# Patient Record
Sex: MALE | Race: BLACK OR AFRICAN AMERICAN | NOT HISPANIC OR LATINO | Employment: FULL TIME | URBAN - METROPOLITAN AREA
[De-identification: names, ages, dates, MRNs, and addresses within clinical notes are randomized per-mention and may not be internally consistent; named-entity substitution may affect disease eponyms.]

---

## 2017-01-18 ENCOUNTER — CONVERSION ENCOUNTER (OUTPATIENT)
Dept: FAMILY MEDICINE CLINIC | Facility: CLINIC | Age: 37
End: 2017-01-18

## 2017-01-18 ENCOUNTER — HOSPITAL ENCOUNTER (OUTPATIENT)
Dept: FAMILY MEDICINE CLINIC | Facility: CLINIC | Age: 37
Setting detail: SPECIMEN
Discharge: HOME | End: 2017-01-18
Attending: FAMILY MEDICINE | Admitting: FAMILY MEDICINE

## 2017-01-18 LAB
BILIRUB UR QL STRIP: NEGATIVE MG/DL
CASTS URNS QL MICRO: ABNORMAL /[LPF]
COLOR UR: YELLOW
CONV BACTERIA IN URINE MICRO: NEGATIVE
CONV CLARITY OF URINE: ABNORMAL
CONV HYALINE CASTS IN URINE MICRO: 2 /[LPF] (ref 0–5)
CONV PROTEIN IN URINE BY AUTOMATED TEST STRIP: NEGATIVE MG/DL
CONV SMALL ROUND CELLS: ABNORMAL /[HPF]
CONV UROBILINOGEN IN URINE BY AUTOMATED TEST STRIP: 0.2 MG/DL
CULTURE INDICATED?: ABNORMAL
GLUCOSE UR QL: NEGATIVE MG/DL
HGB UR QL STRIP: NEGATIVE
KETONES UR QL STRIP: NEGATIVE MG/DL
LEUKOCYTE ESTERASE UR QL STRIP: ABNORMAL
NITRITE UR QL STRIP: NEGATIVE
PH UR STRIP.AUTO: 6 [PH] (ref 4.5–8)
RBC #/AREA URNS HPF: 1 /[HPF] (ref 0–3)
SP GR UR: 1.02 (ref 1–1.03)
SPERM URNS QL MICRO: ABNORMAL /[HPF]
SQUAMOUS SPT QL MICRO: 1 /[HPF] (ref 0–5)
UNIDENT CRYS URNS QL MICRO: ABNORMAL /[HPF]
WBC #/AREA URNS HPF: 10 /[HPF] (ref 0–5)
YEAST SPEC QL WET PREP: ABNORMAL /[HPF]

## 2017-02-03 ENCOUNTER — CONVERSION ENCOUNTER (OUTPATIENT)
Dept: FAMILY MEDICINE CLINIC | Facility: CLINIC | Age: 37
End: 2017-02-03

## 2017-02-03 ENCOUNTER — HOSPITAL ENCOUNTER (OUTPATIENT)
Dept: FAMILY MEDICINE CLINIC | Facility: CLINIC | Age: 37
Setting detail: SPECIMEN
Discharge: HOME | End: 2017-02-03
Attending: FAMILY MEDICINE | Admitting: FAMILY MEDICINE

## 2017-02-03 LAB
ANION GAP SERPL CALC-SCNC: 9.7 MMOL/L (ref 10–20)
BUN SERPL-MCNC: 16 MG/DL (ref 8–20)
BUN/CREAT SERPL: 10 (ref 6.2–20.3)
CALCIUM SERPL-MCNC: 9.3 MG/DL (ref 8.9–10.3)
CHLORIDE SERPL-SCNC: 104 MMOL/L (ref 101–111)
CONV CO2: 25 MMOL/L (ref 22–32)
CREAT UR-MCNC: 1.6 MG/DL (ref 0.7–1.2)
ERYTHROCYTE [DISTWIDTH] IN BLOOD BY AUTOMATED COUNT: 14.3 % (ref 11.5–14.5)
GLUCOSE SERPL-MCNC: 65 MG/DL (ref 65–99)
HCT VFR BLD AUTO: 46.2 % (ref 40–54)
HGB BLD-MCNC: 15 G/DL (ref 14–18)
MCH RBC QN AUTO: 27 PG (ref 26–32)
MCHC RBC AUTO-ENTMCNC: 32.4 G/DL (ref 32–36)
MCV RBC AUTO: 83.2 FL (ref 80–94)
PLATELET # BLD AUTO: 199 10*3/UL (ref 150–450)
PMV BLD AUTO: 8.7 FL (ref 7.4–10.4)
POTASSIUM SERPL-SCNC: 4.7 MMOL/L (ref 3.6–5.1)
RBC # BLD AUTO: 5.55 10*6/UL (ref 4.6–6)
SODIUM SERPL-SCNC: 134 MMOL/L (ref 136–144)
TSH SERPL-ACNC: 42.22 UIU/ML (ref 0.34–5.6)
WBC # BLD AUTO: 4 10*3/UL (ref 4.5–11.5)

## 2017-02-14 ENCOUNTER — HOSPITAL ENCOUNTER (OUTPATIENT)
Dept: ONCOLOGY | Facility: CLINIC | Age: 37
Discharge: HOME | End: 2017-02-14
Attending: INTERNAL MEDICINE | Admitting: INTERNAL MEDICINE

## 2017-02-14 LAB
BASOPHILS # BLD AUTO: 0 10*3/UL (ref 0–0.2)
BASOPHILS NFR BLD AUTO: 1 % (ref 0–2)
DIFFERENTIAL METHOD BLD: (no result)
EOSINOPHIL # BLD AUTO: 0.4 10*3/UL (ref 0–0.3)
EOSINOPHIL # BLD AUTO: 8 % (ref 0–3)
ERYTHROCYTE [DISTWIDTH] IN BLOOD BY AUTOMATED COUNT: 14.4 % (ref 11.5–14.5)
FOLATE SERPL-MCNC: >24.8 NG/ML (ref 5.9–24.8)
HCT VFR BLD AUTO: 46.4 % (ref 40–54)
HGB BLD-MCNC: 15.3 G/DL (ref 14–18)
LDH SERPL-CCNC: 200 IU/L (ref 98–192)
LYMPHOCYTES # BLD AUTO: 1.7 10*3/UL (ref 0.8–4.8)
LYMPHOCYTES NFR BLD AUTO: 33 % (ref 18–42)
MAGNESIUM UR-MCNC: 0.82 % (ref 0.5–1.5)
MCH RBC QN AUTO: 27 PG (ref 26–32)
MCHC RBC AUTO-ENTMCNC: 32.9 G/DL (ref 32–36)
MCV RBC AUTO: 82.2 FL (ref 80–94)
MONOCYTES # BLD AUTO: 0.3 10*3/UL (ref 0.1–1.3)
MONOCYTES NFR BLD AUTO: 7 % (ref 2–11)
NEUTROPHILS # BLD AUTO: 2.7 10*3/UL (ref 2.3–8.6)
NEUTROPHILS NFR BLD AUTO: 51 % (ref 50–75)
NRBC BLD AUTO-RTO: 0 /100{WBCS}
NRBC/RBC NFR BLD MANUAL: 0 10*3/UL
PLATELET # BLD AUTO: 190 10*3/UL (ref 150–450)
PMV BLD AUTO: 9.2 FL (ref 7.4–10.4)
RBC # BLD AUTO: 5.65 10*6/UL (ref 4.6–6)
RETICS/RBC NFR MANUAL: 0.05 10*6/UL
VIT B12 SERPL-MCNC: 589 PG/ML (ref 180–914)
WBC # BLD AUTO: 5.2 10*3/UL (ref 4.5–11.5)

## 2017-02-15 LAB
ANA SER QL IA: ABNORMAL
CENTROMERE B AB SER-ACNC: NEGATIVE
DSDNA AB SER-ACNC: NEGATIVE [IU]/ML
ENA JO1 IGG SER-ACNC: NEGATIVE
ENA RNP AB SER-ACNC: NEGATIVE
ENA SCL70 AB SER QL IA: NEGATIVE
ENA SM AB SER-ACNC: NEGATIVE
ENA SS-B AB SER-ACNC: NEGATIVE
HISTONE IGG SER IA-ACNC: NEGATIVE
SJOGREN'S ANTI-SS-A: ABNORMAL

## 2017-03-01 ENCOUNTER — HOSPITAL ENCOUNTER (OUTPATIENT)
Dept: ONCOLOGY | Facility: CLINIC | Age: 37
Discharge: HOME | End: 2017-03-01
Attending: INTERNAL MEDICINE | Admitting: INTERNAL MEDICINE

## 2018-02-19 ENCOUNTER — HOSPITAL ENCOUNTER (OUTPATIENT)
Dept: FAMILY MEDICINE CLINIC | Facility: CLINIC | Age: 38
Setting detail: SPECIMEN
Discharge: HOME | End: 2018-02-19
Attending: FAMILY MEDICINE | Admitting: FAMILY MEDICINE

## 2018-04-11 ENCOUNTER — CONVERSION ENCOUNTER (OUTPATIENT)
Dept: FAMILY MEDICINE CLINIC | Facility: CLINIC | Age: 38
End: 2018-04-11

## 2018-04-11 ENCOUNTER — HOSPITAL ENCOUNTER (OUTPATIENT)
Dept: FAMILY MEDICINE CLINIC | Facility: CLINIC | Age: 38
Setting detail: SPECIMEN
Discharge: HOME | End: 2018-04-11
Attending: FAMILY MEDICINE | Admitting: FAMILY MEDICINE

## 2018-04-11 LAB
ALBUMIN SERPL-MCNC: 4 G/DL (ref 3.5–4.8)
ALBUMIN/GLOB SERPL: 1.6 {RATIO} (ref 1–1.7)
ALP SERPL-CCNC: 43 IU/L (ref 32–91)
ALT SERPL-CCNC: 21 IU/L (ref 17–63)
ANION GAP SERPL CALC-SCNC: 10.8 MMOL/L (ref 10–20)
AST SERPL-CCNC: 29 IU/L (ref 15–41)
BASOPHILS # BLD AUTO: 0 10*3/UL (ref 0–0.2)
BASOPHILS NFR BLD AUTO: 1 % (ref 0–2)
BILIRUB SERPL-MCNC: 0.9 MG/DL (ref 0.3–1.2)
BUN SERPL-MCNC: 11 MG/DL (ref 8–20)
BUN/CREAT SERPL: 8.5 (ref 6.2–20.3)
CALCIUM SERPL-MCNC: 9.6 MG/DL (ref 8.9–10.3)
CHLORIDE SERPL-SCNC: 102 MMOL/L (ref 101–111)
CHOLEST SERPL-MCNC: 159 MG/DL
CHOLEST/HDLC SERPL: 3.5 {RATIO}
CONV CO2: 28 MMOL/L (ref 22–32)
CONV LDL CHOLESTEROL DIRECT: 86 MG/DL (ref 0–100)
CONV TOTAL PROTEIN: 6.5 G/DL (ref 6.1–7.9)
CREAT UR-MCNC: 1.3 MG/DL (ref 0.7–1.2)
DIFFERENTIAL METHOD BLD: (no result)
EOSINOPHIL # BLD AUTO: 0.2 10*3/UL (ref 0–0.3)
EOSINOPHIL # BLD AUTO: 5 % (ref 0–3)
ERYTHROCYTE [DISTWIDTH] IN BLOOD BY AUTOMATED COUNT: 14.8 % (ref 11.5–14.5)
GLOBULIN UR ELPH-MCNC: 2.5 G/DL (ref 2.5–3.8)
GLUCOSE SERPL-MCNC: 91 MG/DL (ref 65–99)
HCT VFR BLD AUTO: 43.1 % (ref 40–54)
HDLC SERPL-MCNC: 46 MG/DL
HGB BLD-MCNC: 13.9 G/DL (ref 14–18)
LDLC/HDLC SERPL: 1.9 {RATIO}
LIPID INTERPRETATION: ABNORMAL
LYMPHOCYTES # BLD AUTO: 1.7 10*3/UL (ref 0.8–4.8)
LYMPHOCYTES NFR BLD AUTO: 40 % (ref 18–42)
MCH RBC QN AUTO: 26.7 PG (ref 26–32)
MCHC RBC AUTO-ENTMCNC: 32.3 G/DL (ref 32–36)
MCV RBC AUTO: 82.6 FL (ref 80–94)
MONOCYTES # BLD AUTO: 0.2 10*3/UL (ref 0.1–1.3)
MONOCYTES NFR BLD AUTO: 5 % (ref 2–11)
NEUTROPHILS # BLD AUTO: 2.1 10*3/UL (ref 2.3–8.6)
NEUTROPHILS NFR BLD AUTO: 49 % (ref 50–75)
NRBC BLD AUTO-RTO: 0 /100{WBCS}
NRBC/RBC NFR BLD MANUAL: 0 10*3/UL
PLATELET # BLD AUTO: 193 10*3/UL (ref 150–450)
PMV BLD AUTO: 8.6 FL (ref 7.4–10.4)
POTASSIUM SERPL-SCNC: 3.8 MMOL/L (ref 3.6–5.1)
RBC # BLD AUTO: 5.22 10*6/UL (ref 4.6–6)
SODIUM SERPL-SCNC: 137 MMOL/L (ref 136–144)
TRIGL SERPL-MCNC: 65 MG/DL
VLDLC SERPL CALC-MCNC: 27.4 MG/DL
WBC # BLD AUTO: 4.3 10*3/UL (ref 4.5–11.5)

## 2018-08-28 ENCOUNTER — HOSPITAL ENCOUNTER (OUTPATIENT)
Dept: FAMILY MEDICINE CLINIC | Facility: CLINIC | Age: 38
Setting detail: SPECIMEN
Discharge: HOME | End: 2018-08-28
Attending: FAMILY MEDICINE | Admitting: FAMILY MEDICINE

## 2018-08-28 LAB
ANION GAP SERPL CALC-SCNC: 11.6 MMOL/L (ref 10–20)
BUN SERPL-MCNC: 13 MG/DL (ref 8–20)
BUN/CREAT SERPL: 10.8 (ref 6.2–20.3)
CALCIUM SERPL-MCNC: 9.7 MG/DL (ref 8.9–10.3)
CHLORIDE SERPL-SCNC: 106 MMOL/L (ref 101–111)
CONV CO2: 27 MMOL/L (ref 22–32)
CREAT UR-MCNC: 1.2 MG/DL (ref 0.7–1.2)
GLUCOSE SERPL-MCNC: 79 MG/DL (ref 65–99)
POTASSIUM SERPL-SCNC: 4.6 MMOL/L (ref 3.6–5.1)
SODIUM SERPL-SCNC: 140 MMOL/L (ref 136–144)

## 2019-06-04 VITALS
OXYGEN SATURATION: 100 % | WEIGHT: 231 LBS | OXYGEN SATURATION: 100 % | HEIGHT: 69 IN | WEIGHT: 226 LBS | WEIGHT: 229.13 LBS | SYSTOLIC BLOOD PRESSURE: 134 MMHG | BODY MASS INDEX: 34.21 KG/M2 | HEART RATE: 53 BPM | SYSTOLIC BLOOD PRESSURE: 128 MMHG | DIASTOLIC BLOOD PRESSURE: 77 MMHG | HEART RATE: 60 BPM | SYSTOLIC BLOOD PRESSURE: 123 MMHG | HEART RATE: 58 BPM | RESPIRATION RATE: 16 BRPM | OXYGEN SATURATION: 98 % | RESPIRATION RATE: 14 BRPM | DIASTOLIC BLOOD PRESSURE: 86 MMHG | RESPIRATION RATE: 16 BRPM | DIASTOLIC BLOOD PRESSURE: 77 MMHG

## 2019-06-27 RX ORDER — LEVOTHYROXINE SODIUM 0.15 MG/1
TABLET ORAL EVERY 24 HOURS
COMMUNITY
Start: 2017-08-29 | End: 2019-07-10 | Stop reason: SDUPTHER

## 2019-06-27 RX ORDER — HYDROCORTISONE ACETATE 25 MG/1
SUPPOSITORY RECTAL
COMMUNITY
Start: 2019-01-17 | End: 2019-11-25

## 2019-06-27 RX ORDER — VALACYCLOVIR HYDROCHLORIDE 1 G/1
TABLET, FILM COATED ORAL
COMMUNITY
Start: 2018-08-30 | End: 2019-07-10 | Stop reason: SDUPTHER

## 2019-06-28 RX ORDER — VALACYCLOVIR HYDROCHLORIDE 1 G/1
TABLET, FILM COATED ORAL
Qty: 20 TABLET | Refills: 0 | OUTPATIENT
Start: 2019-06-28

## 2019-07-03 RX ORDER — LEVOTHYROXINE SODIUM 0.15 MG/1
TABLET ORAL
Qty: 30 TABLET | Refills: 0 | OUTPATIENT
Start: 2019-07-03

## 2019-07-09 NOTE — TELEPHONE ENCOUNTER
Refill Levothyroxine and Valacyclovir.  [address] [place] [address] in [place].  Scheduled appt on 8/12/19.

## 2019-07-10 RX ORDER — LEVOTHYROXINE SODIUM 0.15 MG/1
150 TABLET ORAL EVERY 24 HOURS
Qty: 31 TABLET | Refills: 0 | Status: SHIPPED | OUTPATIENT
Start: 2019-07-10 | End: 2019-09-13 | Stop reason: SDUPTHER

## 2019-07-10 RX ORDER — VALACYCLOVIR HYDROCHLORIDE 1 G/1
TABLET, FILM COATED ORAL
Qty: 20 TABLET | Refills: 0 | Status: SHIPPED | OUTPATIENT
Start: 2019-07-10 | End: 2019-10-21 | Stop reason: SDUPTHER

## 2019-08-23 RX ORDER — LEVOTHYROXINE SODIUM 0.15 MG/1
TABLET ORAL
Qty: 31 TABLET | Refills: 0 | OUTPATIENT
Start: 2019-08-23

## 2019-08-26 ENCOUNTER — OFFICE VISIT (OUTPATIENT)
Dept: FAMILY MEDICINE CLINIC | Facility: CLINIC | Age: 39
End: 2019-08-26

## 2019-08-26 VITALS
SYSTOLIC BLOOD PRESSURE: 133 MMHG | HEIGHT: 69 IN | WEIGHT: 215 LBS | TEMPERATURE: 97.9 F | BODY MASS INDEX: 31.84 KG/M2 | OXYGEN SATURATION: 99 % | DIASTOLIC BLOOD PRESSURE: 85 MMHG | HEART RATE: 60 BPM | RESPIRATION RATE: 18 BRPM

## 2019-08-26 DIAGNOSIS — E05.00 GRAVES DISEASE: Primary | ICD-10-CM

## 2019-08-26 DIAGNOSIS — E03.9 ACQUIRED HYPOTHYROIDISM: ICD-10-CM

## 2019-08-26 DIAGNOSIS — E55.9 VITAMIN D DEFICIENCY: ICD-10-CM

## 2019-08-26 DIAGNOSIS — E78.49 OTHER HYPERLIPIDEMIA: ICD-10-CM

## 2019-08-26 DIAGNOSIS — R63.5 WEIGHT GAIN: ICD-10-CM

## 2019-08-26 DIAGNOSIS — R73.9 HYPERGLYCEMIA: ICD-10-CM

## 2019-08-26 LAB
ALBUMIN SERPL-MCNC: 3.8 G/DL (ref 3.5–4.8)
ALBUMIN/GLOB SERPL: 1.6 G/DL (ref 1–1.7)
ALP SERPL-CCNC: 40 U/L (ref 32–91)
ALT SERPL W P-5'-P-CCNC: 24 U/L (ref 17–63)
ANION GAP SERPL CALCULATED.3IONS-SCNC: 13.4 MMOL/L (ref 5–15)
ARTICHOKE IGE QN: 93 MG/DL (ref 0–100)
AST SERPL-CCNC: 30 U/L (ref 15–41)
BASOPHILS # BLD AUTO: 0.1 10*3/MM3 (ref 0–0.2)
BASOPHILS NFR BLD AUTO: 1.5 % (ref 0–1.5)
BILIRUB SERPL-MCNC: 0.9 MG/DL (ref 0.3–1.2)
BUN BLD-MCNC: 12 MG/DL (ref 8–20)
BUN/CREAT SERPL: 7.1 (ref 6.2–20.3)
CALCIUM SPEC-SCNC: 9 MG/DL (ref 8.9–10.3)
CHLORIDE SERPL-SCNC: 105 MMOL/L (ref 101–111)
CHOLEST SERPL-MCNC: 150 MG/DL
CO2 SERPL-SCNC: 27 MMOL/L (ref 22–32)
CREAT BLD-MCNC: 1.7 MG/DL (ref 0.7–1.2)
DEPRECATED RDW RBC AUTO: 40.3 FL (ref 37–54)
EOSINOPHIL # BLD AUTO: 0.2 10*3/MM3 (ref 0–0.4)
EOSINOPHIL NFR BLD AUTO: 4.8 % (ref 0.3–6.2)
ERYTHROCYTE [DISTWIDTH] IN BLOOD BY AUTOMATED COUNT: 13.8 % (ref 12.3–15.4)
GFR SERPL CREATININE-BSD FRML MDRD: 55 ML/MIN/1.73
GLOBULIN UR ELPH-MCNC: 2.4 GM/DL (ref 2.5–3.8)
GLUCOSE BLD-MCNC: 88 MG/DL (ref 65–99)
HCT VFR BLD AUTO: 41 % (ref 37.5–51)
HDLC SERPL QL: 3.19
HDLC SERPL-MCNC: 47 MG/DL
HGB BLD-MCNC: 13.8 G/DL (ref 13–17.7)
LDLC/HDLC SERPL: 1.83 {RATIO}
LYMPHOCYTES # BLD AUTO: 1.7 10*3/MM3 (ref 0.7–3.1)
LYMPHOCYTES NFR BLD AUTO: 42.7 % (ref 19.6–45.3)
MCH RBC QN AUTO: 28 PG (ref 26.6–33)
MCHC RBC AUTO-ENTMCNC: 33.7 G/DL (ref 31.5–35.7)
MCV RBC AUTO: 83.3 FL (ref 79–97)
MONOCYTES # BLD AUTO: 0.2 10*3/MM3 (ref 0.1–0.9)
MONOCYTES NFR BLD AUTO: 5.8 % (ref 5–12)
NEUTROPHILS # BLD AUTO: 1.8 10*3/MM3 (ref 1.7–7)
NEUTROPHILS NFR BLD AUTO: 45.2 % (ref 42.7–76)
NRBC BLD AUTO-RTO: 0 /100 WBC (ref 0–0.2)
PLATELET # BLD AUTO: 181 10*3/MM3 (ref 140–450)
PMV BLD AUTO: 9 FL (ref 6–12)
POTASSIUM BLD-SCNC: 4.4 MMOL/L (ref 3.6–5.1)
PROT SERPL-MCNC: 6.2 G/DL (ref 6.1–7.9)
RBC # BLD AUTO: 4.92 10*6/MM3 (ref 4.14–5.8)
SODIUM BLD-SCNC: 141 MMOL/L (ref 136–144)
T4 FREE SERPL-MCNC: 0.77 NG/DL (ref 0.58–1.64)
TRIGL SERPL-MCNC: 86 MG/DL
TSH SERPL DL<=0.05 MIU/L-ACNC: 11.23 MIU/ML (ref 0.34–5.6)
VLDLC SERPL-MCNC: 17.2 MG/DL
WBC NRBC COR # BLD: 4 10*3/MM3 (ref 3.4–10.8)

## 2019-08-26 PROCEDURE — 85025 COMPLETE CBC W/AUTO DIFF WBC: CPT | Performed by: FAMILY MEDICINE

## 2019-08-26 PROCEDURE — 82306 VITAMIN D 25 HYDROXY: CPT | Performed by: FAMILY MEDICINE

## 2019-08-26 PROCEDURE — 83036 HEMOGLOBIN GLYCOSYLATED A1C: CPT | Performed by: FAMILY MEDICINE

## 2019-08-26 PROCEDURE — 80061 LIPID PANEL: CPT | Performed by: FAMILY MEDICINE

## 2019-08-26 PROCEDURE — 36415 COLL VENOUS BLD VENIPUNCTURE: CPT | Performed by: FAMILY MEDICINE

## 2019-08-26 PROCEDURE — 84443 ASSAY THYROID STIM HORMONE: CPT | Performed by: FAMILY MEDICINE

## 2019-08-26 PROCEDURE — 80053 COMPREHEN METABOLIC PANEL: CPT | Performed by: FAMILY MEDICINE

## 2019-08-26 PROCEDURE — 99214 OFFICE O/P EST MOD 30 MIN: CPT | Performed by: FAMILY MEDICINE

## 2019-08-26 PROCEDURE — 84439 ASSAY OF FREE THYROXINE: CPT | Performed by: FAMILY MEDICINE

## 2019-08-26 NOTE — PROGRESS NOTES
Subjective   Bennett Wheeler is a 39 y.o. male.     Chief Complaint   Patient presents with   • Thyroid Problem     meds       History of Present Illness   38 y/o  male  Hx Graves, now with hypothyroid, needs labs to check lip[oids, glucose, Vit d  Needs wellness labs  Family hx DM, hx elevated creatinine    The following portions of the patient's history were reviewed and updated as appropriate: allergies, current medications, past family history, past medical history, past social history, past surgical history and problem list.    Past Medical History:   Diagnosis Date   • Graves disease    • Hemorrhoids    • Thyroid disease        Past Surgical History:   Procedure Laterality Date   • LIPOMA EXCISION  2015    HEAD   • TONSILLECTOMY         Family History   Problem Relation Age of Onset   • Heart disease Mother    • Heart disease Father    • Diabetes Maternal Grandmother    • Heart disease Maternal Grandmother    • Hypertension Maternal Grandmother        Review of Systems   Constitutional: Negative for fatigue, unexpected weight gain and unexpected weight loss.   HENT: Negative for congestion, sinus pressure and sore throat.    Eyes: Negative for blurred vision and visual disturbance.   Respiratory: Negative for cough, shortness of breath and wheezing.    Cardiovascular: Negative for chest pain, palpitations and leg swelling.   Gastrointestinal: Negative for abdominal pain, constipation, diarrhea, nausea, vomiting, GERD and indigestion.   Musculoskeletal: Negative for arthralgias, back pain, gait problem, joint swelling and neck pain.   Skin: Negative for rash, skin lesions and bruise.   Allergic/Immunologic: Negative for environmental allergies.   Neurological: Negative for dizziness, tremors, syncope, weakness, light-headedness, headache and memory problem.   Psychiatric/Behavioral: Negative for sleep disturbance, depressed mood and stress. The patient is not nervous/anxious.        Objective    Physical Exam   Constitutional: He is oriented to person, place, and time. He appears well-developed and well-nourished. No distress.   HENT:   Head: Normocephalic and atraumatic.   Right Ear: External ear normal.   Left Ear: External ear normal.   Nose: Nose normal.   Mouth/Throat: Oropharynx is clear and moist.   Eyes: EOM are normal. Pupils are equal, round, and reactive to light.   Neck: Normal range of motion. Neck supple. No thyromegaly present.   Cardiovascular: Normal rate, regular rhythm and normal heart sounds. Exam reveals no gallop and no friction rub.   No murmur heard.  Pulmonary/Chest: Effort normal. He has no wheezes.   Abdominal: Soft. There is no tenderness.   Musculoskeletal: Normal range of motion. He exhibits no edema, tenderness or deformity.   Lymphadenopathy:     He has no cervical adenopathy.   Neurological: He is alert and oriented to person, place, and time. No cranial nerve deficit.   Skin: Skin is warm and dry. No rash noted. He is not diaphoretic.   Psychiatric: He has a normal mood and affect. His behavior is normal. Judgment and thought content normal.   Nursing note and vitals reviewed.        Assessment/Plan   Bennett was seen today for thyroid problem.    Diagnoses and all orders for this visit:    Graves disease    Acquired hypothyroidism  -     TSH  -     T4, Free  -     CBC & Differential  -     Comprehensive Metabolic Panel  -     CBC Auto Differential    Other hyperlipidemia  -     Lipid Panel; Future  -     Lipid Panel    Hyperglycemia  -     Hemoglobin A1c    Vitamin D deficiency  -     Vitamin D 25 Hydroxy    Weight gain      Fu labs  Refill meds as needed  Fu with Dr. Wall  Increase exercise  Weight loss recommended           Vitals:    08/26/19 1547   BP: 133/85   Pulse: 60   Resp: 18   Temp: 97.9 °F (36.6 °C)   SpO2: 99%       LDL Cholesterol    Date Value Ref Range Status   04/11/2018 86 0 - 100 mg/dL Final

## 2019-08-27 LAB
25(OH)D3 SERPL-MCNC: 29.7 NG/ML (ref 30–100)
HBA1C MFR BLD: 5.9 % (ref 3.5–5.6)

## 2019-09-13 ENCOUNTER — APPOINTMENT (OUTPATIENT)
Dept: GENERAL RADIOLOGY | Facility: HOSPITAL | Age: 39
End: 2019-09-13

## 2019-09-13 ENCOUNTER — HOSPITAL ENCOUNTER (EMERGENCY)
Facility: HOSPITAL | Age: 39
Discharge: HOME | End: 2019-09-13
Attending: EMERGENCY MEDICINE | Admitting: EMERGENCY MEDICINE

## 2019-09-13 VITALS
HEIGHT: 69 IN | SYSTOLIC BLOOD PRESSURE: 123 MMHG | HEART RATE: 55 BPM | DIASTOLIC BLOOD PRESSURE: 78 MMHG | WEIGHT: 214.29 LBS | OXYGEN SATURATION: 99 % | TEMPERATURE: 98.4 F | RESPIRATION RATE: 14 BRPM | BODY MASS INDEX: 31.74 KG/M2

## 2019-09-13 DIAGNOSIS — N28.9 RENAL INSUFFICIENCY: ICD-10-CM

## 2019-09-13 DIAGNOSIS — S91.312A FOOT LACERATION, LEFT, INITIAL ENCOUNTER: Primary | ICD-10-CM

## 2019-09-13 DIAGNOSIS — E03.9 ACQUIRED HYPOTHYROIDISM: Primary | ICD-10-CM

## 2019-09-13 PROCEDURE — 90715 TDAP VACCINE 7 YRS/> IM: CPT | Performed by: EMERGENCY MEDICINE

## 2019-09-13 PROCEDURE — 99283 EMERGENCY DEPT VISIT LOW MDM: CPT

## 2019-09-13 PROCEDURE — 73630 X-RAY EXAM OF FOOT: CPT

## 2019-09-13 PROCEDURE — 25010000002 TDAP 5-2.5-18.5 LF-MCG/0.5 SUSPENSION: Performed by: EMERGENCY MEDICINE

## 2019-09-13 PROCEDURE — 90471 IMMUNIZATION ADMIN: CPT | Performed by: EMERGENCY MEDICINE

## 2019-09-13 RX ORDER — LEVOTHYROXINE SODIUM 175 UG/1
175 TABLET ORAL EVERY 24 HOURS
Qty: 90 TABLET | Refills: 0 | Status: SHIPPED | OUTPATIENT
Start: 2019-09-13 | End: 2019-11-25 | Stop reason: SDUPTHER

## 2019-09-13 RX ORDER — GINSENG 100 MG
CAPSULE ORAL
Status: COMPLETED
Start: 2019-09-13 | End: 2019-09-13

## 2019-09-13 RX ORDER — DIAPER,BRIEF,INFANT-TODD,DISP
EACH MISCELLANEOUS EVERY 12 HOURS SCHEDULED
Status: DISCONTINUED | OUTPATIENT
Start: 2019-09-13 | End: 2019-09-13 | Stop reason: HOSPADM

## 2019-09-13 RX ORDER — CEPHALEXIN 500 MG/1
500 CAPSULE ORAL 3 TIMES DAILY
Qty: 15 CAPSULE | Refills: 0 | Status: SHIPPED | OUTPATIENT
Start: 2019-09-13 | End: 2019-10-21

## 2019-09-13 RX ORDER — LEVOTHYROXINE SODIUM 0.15 MG/1
TABLET ORAL
Qty: 31 TABLET | Refills: 0 | Status: CANCELLED | OUTPATIENT
Start: 2019-09-13

## 2019-09-13 RX ORDER — LEVOTHYROXINE SODIUM 0.15 MG/1
150 TABLET ORAL EVERY 24 HOURS
Qty: 31 TABLET | Refills: 0 | Status: CANCELLED | OUTPATIENT
Start: 2019-09-13

## 2019-09-13 RX ADMIN — TETANUS TOXOID, REDUCED DIPHTHERIA TOXOID AND ACELLULAR PERTUSSIS VACCINE, ADSORBED 0.5 ML: 5; 2.5; 8; 8; 2.5 SUSPENSION INTRAMUSCULAR at 05:12

## 2019-09-13 RX ADMIN — Medication: at 06:48

## 2019-09-13 RX ADMIN — BACITRACIN: 500 OINTMENT TOPICAL at 06:48

## 2019-09-13 NOTE — DISCHARGE INSTRUCTIONS
Keep wound clean with soap and water and apply antibiotic ointment 2-3 times daily.  Return for fever redness increasing pain red streaks swelling foul odor purulent drainage or any other new or worse problems or concerns.  Keflex

## 2019-09-13 NOTE — ED PROVIDER NOTES
Subjective   Chief complaint laceration left foot.    History of present illness 39-year-old male cut his left foot on some glass in [employer] 2 days ago.  He did not have sutures placed he presents this morning for evaluation of the wound.  Mild pain no current bleeding 2 days old continuous nothing makes it better or worse.  Tetanus is not current            Review of Systems   Constitutional: Negative for chills and fever.   Skin: Negative for color change and pallor.   Neurological: Negative for weakness and numbness.   Psychiatric/Behavioral: Negative for agitation and behavioral problems.       Past Medical History:   Diagnosis Date   • Graves disease    • Hemorrhoids    • Thyroid disease        No Known Allergies    Past Surgical History:   Procedure Laterality Date   • LIPOMA EXCISION  2015    HEAD   • TONSILLECTOMY         Family History   Problem Relation Age of Onset   • Heart disease Mother    • Heart disease Father    • Diabetes Maternal Grandmother    • Heart disease Maternal Grandmother    • Hypertension Maternal Grandmother        Social History     Socioeconomic History   • Marital status: Single     Spouse name: Not on file   • Number of children: Not on file   • Years of education: Not on file   • Highest education level: Not on file   Tobacco Use   • Smoking status: Never Smoker   • Smokeless tobacco: Never Used   Substance and Sexual Activity   • Alcohol use: Yes     Frequency: Monthly or less     Prior to Admission medications    Medication Sig Start Date End Date Taking? Authorizing Provider   hydrocortisone (ANUCORT-HC) 25 MG suppository ANUCORT-HC 25 MG SUPP 1/17/19   Shayy Brooks MD   hydrocortisone-pramoxine (ANALPRAM HC) 2.5-1 % rectal cream ANALPRAM HC 2.5-1 % CREA 1/16/19   Shayy Brooks MD   levothyroxine (SYNTHROID) 150 MCG tablet Take 1 tablet by mouth Daily. 7/10/19   Leanna Beth MD   valACYclovir (VALTREX) 1000 MG tablet 1 tab by mouth daily x 5 days PRN  outbreak 7/10/19   Leanna Beth MD           Objective   Physical Exam  39-year-old male awake alert no acute distress.  Left foot has a 2 similar laceration is healing on the left heel mildly open not very deep there is no tenderness palpation is no surrounding erythema warmth no drainage or foul odor.  No red streaks no pain to palpation patient moves his toes and foot without difficulty squeeze on the cath was downgoing no ankle pain neurovascular sensory intact.  No signs of infection.  Procedures           ED Course      Results for orders placed or performed in visit on 08/26/19   TSH   Result Value Ref Range    TSH 11.230 (H) 0.340 - 5.600 mIU/mL   T4, Free   Result Value Ref Range    Free T4 0.77 0.58 - 1.64 ng/dL   Comprehensive Metabolic Panel   Result Value Ref Range    Glucose 88 65 - 99 mg/dL    BUN 12 8 - 20 mg/dL    Creatinine 1.70 (H) 0.70 - 1.20 mg/dL    Sodium 141 136 - 144 mmol/L    Potassium 4.4 3.6 - 5.1 mmol/L    Chloride 105 101 - 111 mmol/L    CO2 27.0 22.0 - 32.0 mmol/L    Calcium 9.0 8.9 - 10.3 mg/dL    Total Protein 6.2 6.1 - 7.9 g/dL    Albumin 3.80 3.50 - 4.80 g/dL    ALT (SGPT) 24 17 - 63 U/L    AST (SGOT) 30 15 - 41 U/L    Alkaline Phosphatase 40 32 - 91 U/L    Total Bilirubin 0.9 0.3 - 1.2 mg/dL    eGFR  African Amer 55 (L) >60 mL/min/1.73    Globulin 2.4 (L) 2.5 - 3.8 gm/dL    A/G Ratio 1.6 1.0 - 1.7 g/dL    BUN/Creatinine Ratio 7.1 6.2 - 20.3    Anion Gap 13.4 5.0 - 15.0 mmol/L   Vitamin D 25 Hydroxy   Result Value Ref Range    25 Hydroxy, Vitamin D 29.7 (L) 30.0 - 100.0 ng/ml   Hemoglobin A1c   Result Value Ref Range    Hemoglobin A1C 5.9 (H) 3.5 - 5.6 %   Lipid Panel   Result Value Ref Range    Total Cholesterol 150 <=200 mg/dL    Triglycerides 86 <=150 mg/dL    HDL Cholesterol 47 >=39 mg/dL    LDL Cholesterol  93 0 - 100 mg/dL    VLDL Cholesterol 17.2 mg/dL    LDL/HDL Ratio 1.83     Chol/HDL Ratio 3.19    CBC Auto Differential   Result Value Ref Range    WBC 4.00 3.40 - 10.80  10*3/mm3    RBC 4.92 4.14 - 5.80 10*6/mm3    Hemoglobin 13.8 13.0 - 17.7 g/dL    Hematocrit 41.0 37.5 - 51.0 %    MCV 83.3 79.0 - 97.0 fL    MCH 28.0 26.6 - 33.0 pg    MCHC 33.7 31.5 - 35.7 g/dL    RDW 13.8 12.3 - 15.4 %    RDW-SD 40.3 37.0 - 54.0 fl    MPV 9.0 6.0 - 12.0 fL    Platelets 181 140 - 450 10*3/mm3    Neutrophil % 45.2 42.7 - 76.0 %    Lymphocyte % 42.7 19.6 - 45.3 %    Monocyte % 5.8 5.0 - 12.0 %    Eosinophil % 4.8 0.3 - 6.2 %    Basophil % 1.5 0.0 - 1.5 %    Neutrophils, Absolute 1.80 1.70 - 7.00 10*3/mm3    Lymphocytes, Absolute 1.70 0.70 - 3.10 10*3/mm3    Monocytes, Absolute 0.20 0.10 - 0.90 10*3/mm3    Eosinophils, Absolute 0.20 0.00 - 0.40 10*3/mm3    Basophils, Absolute 0.10 0.00 - 0.20 10*3/mm3    nRBC 0.0 0.0 - 0.2 /100 WBC     No radiology results for the last day  Medications   bacitracin 500 UNIT/GM ointment  - ADS Override Pull (not administered)   Tdap (BOOSTRIX) injection 0.5 mL (0.5 mL Intramuscular Given 9/13/19 0512)                   MDM  Number of Diagnoses or Management Options  Foot laceration, left, initial encounter:   Diagnosis management comments: Medical decision making.  Patient had the above exam and evaluation patient has a wound is mostly superficial it is 2 days old and on the bottom of the foot and unable to suture at this point.  X-rays were negative for any foreign bodies.  The patient was made aware of the findings we talked about signs of infection and what to return for.  We clean it up we dressed it I do not see any glass in the wound when I looked at it and explored it.  He was stable he has no pain to palpation he will be discharged home for outpatient management.  His tetanus was updated      Final diagnoses:   Foot laceration, left, initial encounter              [place], Rufus S, MD  09/13/19 0646

## 2019-10-21 ENCOUNTER — OFFICE VISIT (OUTPATIENT)
Dept: FAMILY MEDICINE CLINIC | Facility: CLINIC | Age: 39
End: 2019-10-21

## 2019-10-21 VITALS
WEIGHT: 219 LBS | TEMPERATURE: 97.8 F | OXYGEN SATURATION: 100 % | BODY MASS INDEX: 32.44 KG/M2 | SYSTOLIC BLOOD PRESSURE: 135 MMHG | DIASTOLIC BLOOD PRESSURE: 88 MMHG | RESPIRATION RATE: 14 BRPM | HEART RATE: 55 BPM | HEIGHT: 69 IN

## 2019-10-21 DIAGNOSIS — S91.312A LACERATION OF LEFT FOOT, INITIAL ENCOUNTER: Primary | ICD-10-CM

## 2019-10-21 DIAGNOSIS — Z86.19 H/O COLD SORES: ICD-10-CM

## 2019-10-21 PROBLEM — R79.89 ABNORMAL CBC: Status: ACTIVE | Noted: 2017-02-07

## 2019-10-21 PROBLEM — R79.89 SERUM CREATININE RAISED: Status: ACTIVE | Noted: 2017-02-05

## 2019-10-21 PROBLEM — N28.9 RENAL INSUFFICIENCY, MILD: Status: ACTIVE | Noted: 2018-02-19

## 2019-10-21 PROBLEM — R30.0 DIFFICULT OR PAINFUL URINATION: Status: ACTIVE | Noted: 2017-01-18

## 2019-10-21 PROBLEM — E07.9 THYROID DISORDER: Status: ACTIVE | Noted: 2018-02-19

## 2019-10-21 PROBLEM — Z13.6 ENCOUNTER FOR LIPID SCREENING FOR CARDIOVASCULAR DISEASE: Status: ACTIVE | Noted: 2018-04-11

## 2019-10-21 PROBLEM — R35.0 INCREASED FREQUENCY OF URINATION: Status: ACTIVE | Noted: 2017-01-18

## 2019-10-21 PROBLEM — R82.90 ABNORMAL URINALYSIS: Status: ACTIVE | Noted: 2017-01-18

## 2019-10-21 PROBLEM — M25.569 KNEE PAIN: Status: ACTIVE | Noted: 2018-04-11

## 2019-10-21 PROBLEM — Z13.220 ENCOUNTER FOR LIPID SCREENING FOR CARDIOVASCULAR DISEASE: Status: ACTIVE | Noted: 2018-04-11

## 2019-10-21 PROCEDURE — 99213 OFFICE O/P EST LOW 20 MIN: CPT | Performed by: FAMILY MEDICINE

## 2019-10-21 RX ORDER — VALACYCLOVIR HYDROCHLORIDE 1 G/1
TABLET, FILM COATED ORAL
Qty: 20 TABLET | Refills: 0 | Status: SHIPPED | OUTPATIENT
Start: 2019-10-21 | End: 2019-11-25 | Stop reason: SDUPTHER

## 2019-10-21 NOTE — PROGRESS NOTES
Subjective   Bennett Wheeler is a 39 y.o. male.     Chief Complaint   Patient presents with   • Foot Injury     pt cut his foot on a piece of glass in [facility] and went to [facility] ER when he got back and they did a foot Xray,TDAP and gave him a antibiotic.          Current Outpatient Medications:   •  hydrocortisone-pramoxine (ANALPRAM HC) 2.5-1 % rectal cream, ANALPRAM HC 2.5-1 % CREA, Disp: , Rfl:   •  levothyroxine (SYNTHROID, LEVOTHROID) 175 MCG tablet, Take 1 tablet by mouth Daily., Disp: 90 tablet, Rfl: 0  •  valACYclovir (VALTREX) 1000 MG tablet, 2 po bid x 24hrs prn outbreak, Disp: 20 tablet, Rfl: 0  •  hydrocortisone (ANUCORT-HC) 25 MG suppository, ANUCORT-HC 25 MG SUPP, Disp: , Rfl:     Past Medical History:   Diagnosis Date   • Difficult or painful urination 1/18/2017   • Fatigue 7/14/2015   • Graves disease    • Hemorrhoids, external 7/14/2016   • Impotence of organic origin 7/14/2015   • Knee pain 4/11/2018   • Leukocytopenia 8/23/2015   • Leukocytosis 12/15/2015   • Lipoma of skin 4/7/2015   • Obesity 7/14/2016   • Renal function test abnormal 12/15/2015   • Renal insufficiency, mild 2/19/2018   • Serum creatinine raised 2/5/2017   • Snoring 7/14/2016   • Thyroid disorder 2/19/2018       Past Surgical History:   Procedure Laterality Date   • LIPOMA EXCISION  2015    HEAD   • TONSILLECTOMY         Family History   Problem Relation Age of Onset   • Heart disease Mother    • Heart disease Father    • Diabetes Maternal Grandmother    • Heart disease Maternal Grandmother    • Hypertension Maternal Grandmother        Social History     Socioeconomic History   • Marital status: Single     Spouse name: Not on file   • Number of children: Not on file   • Years of education: Not on file   • Highest education level: Not on file   Tobacco Use   • Smoking status: Never Smoker   • Smokeless tobacco: Never Used   Substance and Sexual Activity   • Alcohol use: Yes     Frequency: Monthly or less     Comment: occ   • Drug use:  No   • Sexual activity: Defer        38 y/o AA male here for f/u on a Left heel laceration     Pt states it happened while working in [place] mid Sept----pt stepped on glass cutting his Left heel (cost of seeing someone there would have been $500) and they were leaving the next day so he cleaned it w/ soap and water and kept a bandage on it until seen by ER here w/in 48hrs.... Td was given in ER and Xray done (neg)/ Abx given; Pt is doing well but needing clearance to go back to working out at the gym/ sauna use         The following portions of the patient's history were reviewed and updated as appropriate: allergies, current medications, past family history, past medical history, past social history, past surgical history and problem list.    Review of Systems   Constitutional: Negative for fever.   Musculoskeletal: Negative for gait problem.   Skin: Negative for dry skin, rash and bruise.       Vitals:    10/21/19 0940   BP: 135/88   Pulse: 55   Resp: 14   Temp: 97.8 °F (36.6 °C)   SpO2: 100%       Objective   Physical Exam   Constitutional: He is oriented to person, place, and time. He appears well-developed and well-nourished. No distress.   HENT:   Head: Normocephalic and atraumatic.        Neurological: He is alert and oriented to person, place, and time. No cranial nerve deficit.   Skin: Skin is warm and dry.   Nursing note and vitals reviewed.        Assessment/Plan   Bennett was seen today for foot injury.    Diagnoses and all orders for this visit:    Laceration of left foot, initial encounter    H/O cold sores    Other orders  -     valACYclovir (VALTREX) 1000 MG tablet; 2 po bid x 24hrs prn outbreak    Pt cleared to restart gym and monitor laceration

## 2019-11-19 ENCOUNTER — CLINICAL SUPPORT (OUTPATIENT)
Dept: FAMILY MEDICINE CLINIC | Facility: CLINIC | Age: 39
End: 2019-11-19

## 2019-11-19 DIAGNOSIS — N28.9 RENAL INSUFFICIENCY: ICD-10-CM

## 2019-11-19 DIAGNOSIS — E03.9 ACQUIRED HYPOTHYROIDISM: ICD-10-CM

## 2019-11-19 PROCEDURE — 80048 BASIC METABOLIC PNL TOTAL CA: CPT | Performed by: FAMILY MEDICINE

## 2019-11-19 PROCEDURE — 86376 MICROSOMAL ANTIBODY EACH: CPT | Performed by: FAMILY MEDICINE

## 2019-11-19 PROCEDURE — 84439 ASSAY OF FREE THYROXINE: CPT | Performed by: FAMILY MEDICINE

## 2019-11-19 PROCEDURE — 84443 ASSAY THYROID STIM HORMONE: CPT | Performed by: FAMILY MEDICINE

## 2019-11-19 PROCEDURE — 36415 COLL VENOUS BLD VENIPUNCTURE: CPT | Performed by: FAMILY MEDICINE

## 2019-11-20 LAB
ANION GAP SERPL CALCULATED.3IONS-SCNC: 11.5 MMOL/L (ref 5–15)
BUN BLD-MCNC: 20 MG/DL (ref 6–20)
BUN/CREAT SERPL: 17.5 (ref 7–25)
CALCIUM SPEC-SCNC: 9.4 MG/DL (ref 8.6–10.5)
CHLORIDE SERPL-SCNC: 99 MMOL/L (ref 98–107)
CO2 SERPL-SCNC: 28.5 MMOL/L (ref 22–29)
CREAT BLD-MCNC: 1.14 MG/DL (ref 0.76–1.27)
GFR SERPL CREATININE-BSD FRML MDRD: 87 ML/MIN/1.73
GLUCOSE BLD-MCNC: 89 MG/DL (ref 65–99)
POTASSIUM BLD-SCNC: 4.9 MMOL/L (ref 3.5–5.2)
SODIUM BLD-SCNC: 139 MMOL/L (ref 136–145)
T4 FREE SERPL-MCNC: 0.85 NG/DL (ref 0.93–1.7)
TSH SERPL DL<=0.05 MIU/L-ACNC: 21.9 UIU/ML (ref 0.27–4.2)

## 2019-11-21 LAB — THYROPEROXIDASE AB SERPL-ACNC: 99 IU/ML (ref 0–34)

## 2019-11-25 ENCOUNTER — OFFICE VISIT (OUTPATIENT)
Dept: FAMILY MEDICINE CLINIC | Facility: CLINIC | Age: 39
End: 2019-11-25

## 2019-11-25 VITALS
WEIGHT: 214 LBS | OXYGEN SATURATION: 99 % | SYSTOLIC BLOOD PRESSURE: 123 MMHG | TEMPERATURE: 97.9 F | DIASTOLIC BLOOD PRESSURE: 80 MMHG | HEIGHT: 69 IN | RESPIRATION RATE: 14 BRPM | HEART RATE: 65 BPM | BODY MASS INDEX: 31.7 KG/M2

## 2019-11-25 DIAGNOSIS — R73.01 FASTING HYPERGLYCEMIA: ICD-10-CM

## 2019-11-25 DIAGNOSIS — E03.9 ACQUIRED HYPOTHYROIDISM: Primary | ICD-10-CM

## 2019-11-25 DIAGNOSIS — S91.312D LACERATION OF LEFT HEEL, SUBSEQUENT ENCOUNTER: ICD-10-CM

## 2019-11-25 PROCEDURE — 99213 OFFICE O/P EST LOW 20 MIN: CPT | Performed by: FAMILY MEDICINE

## 2019-11-25 RX ORDER — MULTIPLE VITAMINS W/ MINERALS TAB 9MG-400MCG
1 TAB ORAL DAILY
COMMUNITY
End: 2021-06-09

## 2019-11-25 RX ORDER — DIAPER,BRIEF,INFANT-TODD,DISP
EACH MISCELLANEOUS DAILY PRN
COMMUNITY
End: 2021-06-09

## 2019-11-25 RX ORDER — LEVOTHYROXINE SODIUM 175 UG/1
175 TABLET ORAL EVERY 24 HOURS
Qty: 90 TABLET | Refills: 0 | Status: SHIPPED | OUTPATIENT
Start: 2019-11-25 | End: 2020-02-12 | Stop reason: SDUPTHER

## 2019-11-25 RX ORDER — VALACYCLOVIR HYDROCHLORIDE 1 G/1
TABLET, FILM COATED ORAL
Qty: 20 TABLET | Refills: 0 | Status: SHIPPED | OUTPATIENT
Start: 2019-11-25 | End: 2020-04-20

## 2020-02-11 ENCOUNTER — TELEPHONE (OUTPATIENT)
Dept: FAMILY MEDICINE CLINIC | Facility: CLINIC | Age: 40
End: 2020-02-11

## 2020-02-11 NOTE — TELEPHONE ENCOUNTER
Levothyroxine 175 mcg    [facility]    Last Seen 11/25/2019  Next Appt 2/26/2020

## 2020-02-12 RX ORDER — LEVOTHYROXINE SODIUM 175 UG/1
175 TABLET ORAL EVERY 24 HOURS
Qty: 30 TABLET | Refills: 0 | Status: SHIPPED | OUTPATIENT
Start: 2020-02-12 | End: 2020-02-26 | Stop reason: SDUPTHER

## 2020-02-12 NOTE — TELEPHONE ENCOUNTER
Patient scheduled for labs on 2/20.  Requesting at least a partial refill be sent in to get him through until his appt.

## 2020-02-20 ENCOUNTER — CLINICAL SUPPORT (OUTPATIENT)
Dept: FAMILY MEDICINE CLINIC | Facility: CLINIC | Age: 40
End: 2020-02-20

## 2020-02-20 DIAGNOSIS — E03.9 ACQUIRED HYPOTHYROIDISM: ICD-10-CM

## 2020-02-20 DIAGNOSIS — R73.01 FASTING HYPERGLYCEMIA: ICD-10-CM

## 2020-02-20 PROCEDURE — 84439 ASSAY OF FREE THYROXINE: CPT | Performed by: FAMILY MEDICINE

## 2020-02-20 PROCEDURE — 83036 HEMOGLOBIN GLYCOSYLATED A1C: CPT | Performed by: FAMILY MEDICINE

## 2020-02-20 PROCEDURE — 84443 ASSAY THYROID STIM HORMONE: CPT | Performed by: FAMILY MEDICINE

## 2020-02-20 PROCEDURE — 36415 COLL VENOUS BLD VENIPUNCTURE: CPT | Performed by: FAMILY MEDICINE

## 2020-02-21 LAB
HBA1C MFR BLD: 5.4 % (ref 3.5–5.6)
T4 FREE SERPL-MCNC: 0.89 NG/DL (ref 0.93–1.7)
TSH SERPL DL<=0.05 MIU/L-ACNC: 39.2 UIU/ML (ref 0.27–4.2)

## 2020-02-26 ENCOUNTER — OFFICE VISIT (OUTPATIENT)
Dept: FAMILY MEDICINE CLINIC | Facility: CLINIC | Age: 40
End: 2020-02-26

## 2020-02-26 VITALS
HEART RATE: 52 BPM | OXYGEN SATURATION: 98 % | TEMPERATURE: 97.9 F | DIASTOLIC BLOOD PRESSURE: 88 MMHG | RESPIRATION RATE: 18 BRPM | HEIGHT: 69 IN | BODY MASS INDEX: 31.99 KG/M2 | SYSTOLIC BLOOD PRESSURE: 137 MMHG | WEIGHT: 216 LBS

## 2020-02-26 DIAGNOSIS — M25.562 ACUTE PAIN OF LEFT KNEE: ICD-10-CM

## 2020-02-26 DIAGNOSIS — M77.12 LATERAL EPICONDYLITIS OF LEFT ELBOW: ICD-10-CM

## 2020-02-26 DIAGNOSIS — M70.52 SUPRAPATELLAR BURSITIS OF LEFT KNEE: ICD-10-CM

## 2020-02-26 DIAGNOSIS — E03.9 ACQUIRED HYPOTHYROIDISM: Primary | ICD-10-CM

## 2020-02-26 PROCEDURE — 99214 OFFICE O/P EST MOD 30 MIN: CPT | Performed by: FAMILY MEDICINE

## 2020-02-26 RX ORDER — LEVOTHYROXINE SODIUM 175 UG/1
175 TABLET ORAL EVERY 24 HOURS
Qty: 90 TABLET | Refills: 0 | Status: SHIPPED | OUTPATIENT
Start: 2020-02-26 | End: 2020-04-14 | Stop reason: SDUPTHER

## 2020-02-26 NOTE — PROGRESS NOTES
Subjective   Bennett Wheeler is a 39 y.o. male.     Chief Complaint   Patient presents with   • Thyroid Problem     lab results   • Follow-up     3 mon         Current Outpatient Medications:   •  hydrocortisone ( HYDROCORTISONE) 1 % cream, Apply  topically to the appropriate area as directed Daily As Needed., Disp: , Rfl:   •  levothyroxine (SYNTHROID, LEVOTHROID) 175 MCG tablet, Take 1 tablet by mouth Daily., Disp: 90 tablet, Rfl: 0  •  Multiple Vitamins-Minerals (MULTIVITAMIN WITH MINERALS) tablet tablet, Take 1 tablet by mouth Daily., Disp: , Rfl:   •  valACYclovir (VALTREX) 1000 MG tablet, 2 po bid x 24hrs prn outbreak, Disp: 20 tablet, Rfl: 0    Past Medical History:   Diagnosis Date   • Hemorrhoids, external 7/14/2016   • Hypothyroidism    • Obesity 7/14/2016       Past Surgical History:   Procedure Laterality Date   • LIPOMA EXCISION  2015    HEAD   • TONSILLECTOMY         Family History   Problem Relation Age of Onset   • Heart disease Mother    • Heart disease Father    • Diabetes Maternal Grandmother    • Heart disease Maternal Grandmother    • Hypertension Maternal Grandmother        Social History     Socioeconomic History   • Marital status: Single     Spouse name: Not on file   • Number of children: Not on file   • Years of education: Not on file   • Highest education level: Not on file   Tobacco Use   • Smoking status: Never Smoker   • Smokeless tobacco: Never Used   Substance and Sexual Activity   • Alcohol use: Yes     Frequency: 2-3 times a week     Comment: occ   • Drug use: No   • Sexual activity: Defer       38 y/o AA male w/ hypothyroid here w/ new c/o's Left elbow and knee pain    Pt admits he forgets to take the thyroid med every day......    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 [employer] and uses a handheld phone while he is working-----he started noticing pain above his elbow relieved when he put his phone down and worse at nite when he picks up his Ipad---he usu  it  for prolonged time during the day to put in and check his work orders; no tx tried; no swelling  Trying to do push ups makes it hurt too    Pt also w/ c/o's Left knee pain w/ certain movements above his knee area in the front; worse w/ sprints/ squats and when they jump on one leg during his aerobic class; no known injury and doesn't hurt all the time  Pt also started going to the gym to work out and runs laps a couple times a week (19 laps or so is a mile) and admits he doesn't change directions during the run       The following portions of the patient's history were reviewed and updated as appropriate: allergies, current medications, past family history, past medical history, past social history, past surgical history and problem list.    Review of Systems   Musculoskeletal: Positive for arthralgias and myalgias. Negative for back pain, joint swelling and neck pain.   Skin: Negative for rash, skin lesions and bruise.   Neurological: Negative for syncope and weakness.       Vitals:    02/26/20 1056   BP: 137/88   Pulse: 52   Resp: 18   Temp: 97.9 °F (36.6 °C)   SpO2: 98%       Objective   Physical Exam   Constitutional: He is oriented to person, place, and time. He appears well-developed and well-nourished. He appears distressed.   HENT:   Head: Normocephalic and atraumatic.   Musculoskeletal: Normal range of motion. He exhibits tenderness. He exhibits no edema or deformity.        Left elbow: He exhibits normal range of motion, no swelling, no effusion and no deformity. Tenderness found. Medial epicondyle and lateral epicondyle tenderness noted. No radial head and no olecranon process tenderness noted.        Left knee: He exhibits normal range of motion, no swelling, no effusion, no ecchymosis, no deformity, no laceration, no erythema, normal alignment and no bony tenderness.        Arms:       Legs:  Neurological: He is alert and oriented to person, place, and time. No cranial nerve deficit.   Skin: Skin is  warm and dry. No rash noted. No erythema.   Psychiatric: He has a normal mood and affect. His behavior is normal. Judgment and thought content normal.   Nursing note and vitals reviewed.        Assessment/Plan   Bennett was seen today for thyroid problem and follow-up.    Diagnoses and all orders for this visit:    Acquired hypothyroidism  -     T4, Free; Future  -     TSH; Future    Suprapatellar bursitis of left knee    Acute pain of left knee    Lateral epicondylitis of left elbow    Other orders  -     levothyroxine (SYNTHROID, LEVOTHROID) 175 MCG tablet; Take 1 tablet by mouth Daily.      Pt to try a tennis brace while at work or adjust work load  Pt to change directions if running in/s every 5 laps if cant run o/s  Pt to avoid one legged jumping and squats until better and use ice/ NSAIDS prn  Pt to do straight leg strengthening exercises for Left LE

## 2020-04-14 RX ORDER — LEVOTHYROXINE SODIUM 175 UG/1
175 TABLET ORAL EVERY 24 HOURS
Qty: 30 TABLET | Refills: 0 | Status: CANCELLED | OUTPATIENT
Start: 2020-04-14

## 2020-04-14 RX ORDER — LEVOTHYROXINE SODIUM 175 UG/1
175 TABLET ORAL EVERY 24 HOURS
Qty: 30 TABLET | Refills: 3 | Status: SHIPPED | OUTPATIENT
Start: 2020-04-14 | End: 2020-05-29 | Stop reason: SDUPTHER

## 2020-04-14 NOTE — TELEPHONE ENCOUNTER
Synthroid **ins will only cover 30 day only** last one was sent for 90, but he could only get 30d    [facility] [address]    Seen 2/2020 and labs  sched 5/2020 and recheck on labs

## 2020-04-20 RX ORDER — VALACYCLOVIR HYDROCHLORIDE 1 G/1
TABLET, FILM COATED ORAL
Qty: 20 TABLET | Refills: 0 | Status: SHIPPED | OUTPATIENT
Start: 2020-04-20 | End: 2020-05-26

## 2020-05-26 RX ORDER — VALACYCLOVIR HYDROCHLORIDE 1 G/1
TABLET, FILM COATED ORAL
Qty: 20 TABLET | Refills: 0 | Status: SHIPPED | OUTPATIENT
Start: 2020-05-26 | End: 2020-07-14

## 2020-05-28 PROCEDURE — 84439 ASSAY OF FREE THYROXINE: CPT | Performed by: FAMILY MEDICINE

## 2020-05-28 PROCEDURE — 84443 ASSAY THYROID STIM HORMONE: CPT | Performed by: FAMILY MEDICINE

## 2020-05-29 RX ORDER — LEVOTHYROXINE SODIUM 175 UG/1
175 TABLET ORAL EVERY 24 HOURS
Qty: 90 TABLET | Refills: 1 | Status: SHIPPED | OUTPATIENT
Start: 2020-05-29 | End: 2021-01-04

## 2020-07-14 RX ORDER — VALACYCLOVIR HYDROCHLORIDE 1 G/1
TABLET, FILM COATED ORAL
Qty: 20 TABLET | Refills: 0 | Status: SHIPPED | OUTPATIENT
Start: 2020-07-14 | End: 2020-10-12

## 2020-10-12 RX ORDER — VALACYCLOVIR HYDROCHLORIDE 1 G/1
TABLET, FILM COATED ORAL
Qty: 20 TABLET | Refills: 0 | Status: SHIPPED | OUTPATIENT
Start: 2020-10-12 | End: 2020-12-23

## 2020-12-11 ENCOUNTER — OFFICE VISIT (OUTPATIENT)
Dept: FAMILY MEDICINE CLINIC | Facility: CLINIC | Age: 40
End: 2020-12-11

## 2020-12-11 VITALS
DIASTOLIC BLOOD PRESSURE: 88 MMHG | HEIGHT: 69 IN | SYSTOLIC BLOOD PRESSURE: 139 MMHG | WEIGHT: 216 LBS | TEMPERATURE: 97.8 F | RESPIRATION RATE: 16 BRPM | BODY MASS INDEX: 31.99 KG/M2 | OXYGEN SATURATION: 100 % | HEART RATE: 60 BPM

## 2020-12-11 DIAGNOSIS — H10.31 ACUTE CONJUNCTIVITIS OF RIGHT EYE, UNSPECIFIED ACUTE CONJUNCTIVITIS TYPE: ICD-10-CM

## 2020-12-11 DIAGNOSIS — H57.89 REDNESS OF EYE, RIGHT: Primary | ICD-10-CM

## 2020-12-11 PROCEDURE — 99213 OFFICE O/P EST LOW 20 MIN: CPT | Performed by: FAMILY MEDICINE

## 2020-12-11 RX ORDER — POLYMYXIN B SULFATE AND TRIMETHOPRIM 1; 10000 MG/ML; [USP'U]/ML
SOLUTION OPHTHALMIC
COMMUNITY
Start: 2020-12-06 | End: 2021-06-09

## 2020-12-11 NOTE — PROGRESS NOTES
Subjective   Bennett Wheeler is a 40 y.o. male.     Chief Complaint   Patient presents with   • eye redness         Current Outpatient Medications:   •  hydrocortisone ( HYDROCORTISONE) 1 % cream, Apply  topically to the appropriate area as directed Daily As Needed., Disp: , Rfl:   •  levothyroxine (SYNTHROID, LEVOTHROID) 175 MCG tablet, Take 1 tablet by mouth Daily., Disp: 90 tablet, Rfl: 1  •  Multiple Vitamins-Minerals (MULTIVITAMIN WITH MINERALS) tablet tablet, Take 1 tablet by mouth Daily., Disp: , Rfl:   •  trimethoprim-polymyxin b (POLYTRIM) 38684-4.1 UNIT/ML-% ophthalmic solution, , Disp: , Rfl:   •  valACYclovir (VALTREX) 1000 MG tablet, TAKE TWO TABLETS BY MOUTH TWICE A DAY FOR 24 HOURS AS NEEDED FOR OUTBREAK, Disp: 20 tablet, Rfl: 0    Past Medical History:   Diagnosis Date   • Hemorrhoids, external 7/14/2016   • Hypothyroidism    • Obesity 7/14/2016       Past Surgical History:   Procedure Laterality Date   • LIPOMA EXCISION  2015    HEAD   • TONSILLECTOMY         Family History   Problem Relation Age of Onset   • Heart disease Mother    • Heart disease Father    • Diabetes Maternal Grandmother    • Heart disease Maternal Grandmother    • Hypertension Maternal Grandmother        Social History     Socioeconomic History   • Marital status: Single     Spouse name: Not on file   • Number of children: Not on file   • Years of education: Not on file   • Highest education level: Not on file   Tobacco Use   • Smoking status: Never Smoker   • Smokeless tobacco: Never Used   Substance and Sexual Activity   • Alcohol use: Yes     Frequency: 2-3 times a week     Comment: occ   • Drug use: No   • Sexual activity: Defer       41 y/o AA male here w/ c/o's Rt eye redness and irritation    Pt states it started on Sun w/  Rt eye irritation/ redness so he went to  that same day; they tested him for STD's (due to some ?dysuria) and tx'd him w/ Azithromycin 1gm po x 1 and polytrim opth drops.  Pt then had his regular eye  appt @ [facility] on Wed and was told it could be a virus -----so they gave him prednisolone opth drops to use---(4 drops QID) and to stop the abx eye drops...... 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    Review of Systems   Constitutional: Negative for activity change, appetite change, fatigue, fever, unexpected weight gain and unexpected weight loss.   Eyes: Positive for pain and redness. Negative for blurred vision, double vision, photophobia, discharge, itching and visual disturbance.   Respiratory: Negative for shortness of breath.    Genitourinary: Negative for dysuria.   Skin: Negative for rash.       Vitals:    12/11/20 1147   BP: 139/88   Pulse: 60   Resp: 16   Temp: 97.8 °F (36.6 °C)   SpO2: 100%       Objective   Physical Exam  Vitals signs and nursing note reviewed.   Constitutional:       General: He is not in acute distress.     Appearance: Normal appearance. He is normal weight. He is not ill-appearing, toxic-appearing or diaphoretic.   HENT:      Head: Normocephalic and atraumatic.   Eyes:      General: Lids are normal. Lids are everted, no foreign bodies appreciated. Vision grossly intact. No scleral icterus.        Right eye: No foreign body, discharge or  hordeolum.      Extraocular Movements: Extraocular movements intact.      Right eye: Normal extraocular motion.      Left eye: Normal extraocular motion.      Conjunctiva/sclera:      Right eye: Right conjunctiva is injected. No exudate or hemorrhage.     Left eye: Left conjunctiva is injected. No chemosis, exudate or hemorrhage.       Comments: Rt eye eval for poss corneal abrasion---NEG; no FB seen on eval.    Skin:     Findings: No rash.   Neurological:      Mental Status: He is alert and oriented to person, place, and time.      Cranial Nerves: No cranial nerve deficit.   Psychiatric:         Mood and Affect: Mood normal.         Behavior: Behavior normal.         Thought Content: Thought content normal.         Judgment: Judgment normal.           Assessment/Plan   Diagnoses and all orders for this visit:    1. Redness of eye, right (Primary)    2. Acute conjunctivitis of right eye, unspecified acute conjunctivitis type    Pt to see Op[facility] at [facility]

## 2020-12-23 RX ORDER — VALACYCLOVIR HYDROCHLORIDE 1 G/1
TABLET, FILM COATED ORAL
Qty: 20 TABLET | Refills: 0 | Status: SHIPPED | OUTPATIENT
Start: 2020-12-23 | End: 2021-01-15 | Stop reason: SDUPTHER

## 2021-01-04 RX ORDER — LEVOTHYROXINE SODIUM 175 UG/1
TABLET ORAL
Qty: 90 TABLET | Refills: 0 | Status: SHIPPED | OUTPATIENT
Start: 2021-01-04 | End: 2021-04-05

## 2021-01-04 NOTE — TELEPHONE ENCOUNTER
Last visit: 12/11/20  Next visit:none  Last labs: 5/28/20    Rx requested:Euthyrox  Pharmacy: [facility] in [place]

## 2021-01-15 RX ORDER — VALACYCLOVIR HYDROCHLORIDE 1 G/1
TABLET, FILM COATED ORAL
Qty: 20 TABLET | Refills: 3 | Status: SHIPPED | OUTPATIENT
Start: 2021-01-15 | End: 2021-08-31

## 2021-01-15 RX ORDER — VALACYCLOVIR HYDROCHLORIDE 1 G/1
TABLET, FILM COATED ORAL
Qty: 20 TABLET | Refills: 0 | OUTPATIENT
Start: 2021-01-15

## 2021-04-05 DIAGNOSIS — E03.9 ACQUIRED HYPOTHYROIDISM: Primary | ICD-10-CM

## 2021-04-05 DIAGNOSIS — Z13.6 ENCOUNTER FOR LIPID SCREENING FOR CARDIOVASCULAR DISEASE: ICD-10-CM

## 2021-04-05 DIAGNOSIS — Z13.220 ENCOUNTER FOR LIPID SCREENING FOR CARDIOVASCULAR DISEASE: ICD-10-CM

## 2021-04-05 RX ORDER — LEVOTHYROXINE SODIUM 175 UG/1
TABLET ORAL
Qty: 90 TABLET | Refills: 0 | Status: SHIPPED | OUTPATIENT
Start: 2021-04-05 | End: 2021-06-09

## 2021-04-05 NOTE — TELEPHONE ENCOUNTER
Last visit: 12/11/20  Next visit:none  Last labs: 5/28/20    Rx requested: Euthyrox  Pharmacy: [facility] in [place]

## 2021-05-26 ENCOUNTER — CLINICAL SUPPORT (OUTPATIENT)
Dept: FAMILY MEDICINE CLINIC | Facility: CLINIC | Age: 41
End: 2021-05-26

## 2021-05-26 DIAGNOSIS — Z13.220 ENCOUNTER FOR LIPID SCREENING FOR CARDIOVASCULAR DISEASE: ICD-10-CM

## 2021-05-26 DIAGNOSIS — Z13.6 ENCOUNTER FOR LIPID SCREENING FOR CARDIOVASCULAR DISEASE: ICD-10-CM

## 2021-05-26 DIAGNOSIS — E03.9 ACQUIRED HYPOTHYROIDISM: ICD-10-CM

## 2021-05-26 PROCEDURE — 84439 ASSAY OF FREE THYROXINE: CPT | Performed by: FAMILY MEDICINE

## 2021-05-26 PROCEDURE — 36415 COLL VENOUS BLD VENIPUNCTURE: CPT | Performed by: FAMILY MEDICINE

## 2021-05-26 PROCEDURE — 80061 LIPID PANEL: CPT | Performed by: FAMILY MEDICINE

## 2021-05-26 PROCEDURE — 84443 ASSAY THYROID STIM HORMONE: CPT | Performed by: FAMILY MEDICINE

## 2021-05-26 PROCEDURE — 80053 COMPREHEN METABOLIC PANEL: CPT | Performed by: FAMILY MEDICINE

## 2021-05-27 LAB
ALBUMIN SERPL-MCNC: 4.1 G/DL (ref 3.5–5.2)
ALBUMIN/GLOB SERPL: 1.6 G/DL
ALP SERPL-CCNC: 66 U/L (ref 39–117)
ALT SERPL W P-5'-P-CCNC: 23 U/L (ref 1–41)
ANION GAP SERPL CALCULATED.3IONS-SCNC: 7.4 MMOL/L (ref 5–15)
AST SERPL-CCNC: 31 U/L (ref 1–40)
BILIRUB SERPL-MCNC: 0.7 MG/DL (ref 0–1.2)
BUN SERPL-MCNC: 26 MG/DL (ref 6–20)
BUN/CREAT SERPL: 24.3 (ref 7–25)
CALCIUM SPEC-SCNC: 9.3 MG/DL (ref 8.6–10.5)
CHLORIDE SERPL-SCNC: 107 MMOL/L (ref 98–107)
CHOLEST SERPL-MCNC: 138 MG/DL (ref 0–200)
CO2 SERPL-SCNC: 26.6 MMOL/L (ref 22–29)
CREAT SERPL-MCNC: 1.07 MG/DL (ref 0.76–1.27)
GFR SERPL CREATININE-BSD FRML MDRD: 92 ML/MIN/1.73
GLOBULIN UR ELPH-MCNC: 2.5 GM/DL
GLUCOSE SERPL-MCNC: 92 MG/DL (ref 65–99)
HDLC SERPL-MCNC: 49 MG/DL (ref 40–60)
LDLC SERPL CALC-MCNC: 74 MG/DL (ref 0–100)
LDLC/HDLC SERPL: 1.5 {RATIO}
POTASSIUM SERPL-SCNC: 4.6 MMOL/L (ref 3.5–5.2)
PROT SERPL-MCNC: 6.6 G/DL (ref 6–8.5)
SODIUM SERPL-SCNC: 141 MMOL/L (ref 136–145)
T4 FREE SERPL-MCNC: 2.23 NG/DL (ref 0.93–1.7)
TRIGL SERPL-MCNC: 78 MG/DL (ref 0–150)
TSH SERPL DL<=0.05 MIU/L-ACNC: 0.67 UIU/ML (ref 0.27–4.2)
VLDLC SERPL-MCNC: 15 MG/DL (ref 5–40)

## 2021-06-07 PROBLEM — B30.9 VIRAL CONJUNCTIVITIS: Status: ACTIVE | Noted: 2020-12-11

## 2021-06-09 ENCOUNTER — OFFICE VISIT (OUTPATIENT)
Dept: FAMILY MEDICINE CLINIC | Facility: CLINIC | Age: 41
End: 2021-06-09

## 2021-06-09 VITALS
OXYGEN SATURATION: 96 % | TEMPERATURE: 98.2 F | SYSTOLIC BLOOD PRESSURE: 121 MMHG | DIASTOLIC BLOOD PRESSURE: 80 MMHG | RESPIRATION RATE: 14 BRPM | HEIGHT: 69 IN | WEIGHT: 212 LBS | HEART RATE: 58 BPM | BODY MASS INDEX: 31.4 KG/M2

## 2021-06-09 DIAGNOSIS — Z00.00 ANNUAL PHYSICAL EXAM: Primary | ICD-10-CM

## 2021-06-09 DIAGNOSIS — E03.9 ACQUIRED HYPOTHYROIDISM: ICD-10-CM

## 2021-06-09 DIAGNOSIS — D22.9 NEVUS: ICD-10-CM

## 2021-06-09 LAB
BILIRUB BLD-MCNC: NEGATIVE MG/DL
CLARITY, POC: CLEAR
COLOR UR: YELLOW
GLUCOSE UR STRIP-MCNC: NEGATIVE MG/DL
KETONES UR QL: NEGATIVE
LEUKOCYTE EST, POC: NEGATIVE
NITRITE UR-MCNC: NEGATIVE MG/ML
PH UR: 6 [PH] (ref 5–8)
PROT UR STRIP-MCNC: NEGATIVE MG/DL
RBC # UR STRIP: NEGATIVE /UL
SP GR UR: 1.02 (ref 1–1.03)
UROBILINOGEN UR QL: NORMAL

## 2021-06-09 PROCEDURE — 81003 URINALYSIS AUTO W/O SCOPE: CPT | Performed by: FAMILY MEDICINE

## 2021-06-09 PROCEDURE — 99396 PREV VISIT EST AGE 40-64: CPT | Performed by: FAMILY MEDICINE

## 2021-06-09 RX ORDER — LEVOTHYROXINE SODIUM 0.15 MG/1
150 TABLET ORAL DAILY
Qty: 90 TABLET | Refills: 0 | Status: SHIPPED | OUTPATIENT
Start: 2021-06-09 | End: 2021-11-11

## 2021-06-09 NOTE — PROGRESS NOTES
Subjective   Bennett Wheeler is a 41 y.o. male.     Chief Complaint   Patient presents with   • Annual Exam         Current Outpatient Medications:   •  levothyroxine (Euthyrox) 150 MCG tablet, Take 1 tablet by mouth Daily., Disp: 90 tablet, Rfl: 0  •  valACYclovir (VALTREX) 1000 MG tablet, 2 po q12 hrs x 1 day prn cold sore, Disp: 20 tablet, Rfl: 3    Past Medical History:   Diagnosis Date   • Hemorrhoids, external 7/14/2016   • Hypothyroidism    • Obesity 7/14/2016       Past Surgical History:   Procedure Laterality Date   • LIPOMA EXCISION  2015    HEAD   • TONSILLECTOMY         Family History   Problem Relation Age of Onset   • Heart disease Mother    • Heart disease Father    • Diabetes Maternal Grandmother    • Heart disease Maternal Grandmother    • Hypertension Maternal Grandmother    • Kidney disease Maternal Grandmother        Social History     Socioeconomic History   • Marital status: Single     Spouse name: Not on file   • Number of children: Not on file   • Years of education: Not on file   • Highest education level: Not on file   Tobacco Use   • Smoking status: Never Smoker   • Smokeless tobacco: Never Used   Vaping Use   • Vaping Use: Never used   Substance and Sexual Activity   • Alcohol use: Yes     Comment: occ   • Drug use: No   • Sexual activity: Defer       40 y/o AA male w/ hx/o Hypothyroidism here for annual PE    Pt states doing well w/ exercise and diet  Pt tolerating current med/ dose w/o side effects  Pt sees dentist regularly and optho annually       The following portions of the patient's history were reviewed and updated as appropriate: allergies, current medications, past family history, past medical history, past social history, past surgical history and problem list.    Review of Systems   Constitutional: Negative for activity change, appetite change, fatigue, unexpected weight gain and unexpected weight loss.   HENT: Negative for congestion, ear pain, hearing loss, nosebleeds,  postnasal drip, rhinorrhea, sinus pressure, sneezing, sore throat, tinnitus and trouble swallowing.    Eyes: Positive for visual disturbance (bifocals). Negative for blurred vision, double vision, pain, discharge and itching.   Respiratory: Negative for cough and shortness of breath.    Cardiovascular: Negative for chest pain.   Gastrointestinal: Negative for abdominal pain, constipation, diarrhea, nausea, vomiting, GERD and indigestion.   Genitourinary: Negative for difficulty urinating, dysuria, flank pain, frequency, urgency and urinary incontinence.   Musculoskeletal: Negative for arthralgias and myalgias.   Skin: Negative for rash and skin lesions.   Neurological: Negative for weakness, memory problem and confusion.   Psychiatric/Behavioral: Negative for agitation, self-injury, suicidal ideas, depressed mood and stress. The patient is not nervous/anxious.        Vitals:    06/09/21 0909   BP: 121/80   Pulse: 58   Resp: 14   Temp: 98.2 °F (36.8 °C)   SpO2: 96%       Objective   Physical Exam  Vitals and nursing note reviewed.   Constitutional:       General: He is not in acute distress.     Appearance: Normal appearance. He is well-developed. He is not ill-appearing, toxic-appearing or diaphoretic.   HENT:      Head: Normocephalic and atraumatic.      Right Ear: Tympanic membrane, ear canal and external ear normal. There is no impacted cerumen.      Left Ear: Tympanic membrane, ear canal and external ear normal. There is no impacted cerumen.      Nose: Nose normal. No congestion or rhinorrhea.      Mouth/Throat:      Mouth: Mucous membranes are moist.      Pharynx: Oropharynx is clear. No oropharyngeal exudate or posterior oropharyngeal erythema.   Eyes:      Extraocular Movements: Extraocular movements intact.      Conjunctiva/sclera: Conjunctivae normal.      Pupils: Pupils are equal, round, and reactive to light.   Neck:      Thyroid: No thyromegaly.      Vascular: No carotid bruit.   Cardiovascular:       Rate and Rhythm: Normal rate and regular rhythm.      Heart sounds: Normal heart sounds. No murmur heard.     Pulmonary:      Effort: Pulmonary effort is normal. No respiratory distress.      Breath sounds: Normal breath sounds. No stridor. No wheezing or rales.   Abdominal:      General: Bowel sounds are normal. There is no distension.      Palpations: Abdomen is soft. There is no mass.      Tenderness: There is no abdominal tenderness. There is no guarding or rebound.      Hernia: No hernia is present. There is no hernia in the left inguinal area or right inguinal area.   Genitourinary:     Penis: Normal and circumcised. No erythema or discharge.       Testes: Normal.         Right: Mass, tenderness or swelling not present.         Left: Mass, tenderness or swelling not present.   Musculoskeletal:         General: Normal range of motion.      Cervical back: Normal range of motion and neck supple.      Right lower leg: No edema.      Left lower leg: No edema.   Lymphadenopathy:      Cervical: No cervical adenopathy.      Lower Body: No right inguinal adenopathy. No left inguinal adenopathy.   Skin:     General: Skin is warm and dry.      Capillary Refill: Capillary refill takes less than 2 seconds.      Findings: No erythema or rash.          Neurological:      General: No focal deficit present.      Mental Status: He is alert and oriented to person, place, and time. Mental status is at baseline.      Cranial Nerves: No cranial nerve deficit.      Motor: No abnormal muscle tone.      Deep Tendon Reflexes: Reflexes normal.   Psychiatric:         Attention and Perception: Attention and perception normal.         Mood and Affect: Mood and affect normal.         Speech: Speech normal.         Behavior: Behavior normal. Behavior is cooperative.         Thought Content: Thought content normal.         Cognition and Memory: Cognition and memory normal.         Judgment: Judgment normal.           Assessment/Plan    Diagnoses and all orders for this visit:    1. Annual physical exam (Primary)  -     POC Urinalysis Dipstick, Automated    2. Acquired hypothyroidism  -     TSH; Future  -     T4, Free; Future    3. Nevus    Other orders  -     levothyroxine (Euthyrox) 150 MCG tablet; Take 1 tablet by mouth Daily.  Dispense: 90 tablet; Refill: 0    Cont w/ current exercise/ healthy diet  Disc'd recent lab results w/ pt  Trial of QOD 150mcg w/ 175mcg and rech thyroid labs in 2-3mos

## 2021-08-12 NOTE — TELEPHONE ENCOUNTER
Rx Refill Note  Requested Prescriptions     Pending Prescriptions Disp Refills   • Euthyrox 175 MCG tablet [Pharmacy Med Name: Euthyrox 175 MCG Oral Tablet] 90 tablet 0     Sig: Take 1 tablet by mouth once daily      Last office visit with prescribing clinician: 6/9/2021      Next office visit with prescribing clinician: Visit date not found     T4, Free (05/26/2021 08:35)         Elvia Pike, RT  08/12/21, 15:47 EDT

## 2021-08-13 RX ORDER — LEVOTHYROXINE SODIUM 175 UG/1
TABLET ORAL
Qty: 90 TABLET | Refills: 0 | OUTPATIENT
Start: 2021-08-13

## 2021-08-31 RX ORDER — VALACYCLOVIR HYDROCHLORIDE 1 G/1
TABLET, FILM COATED ORAL
Qty: 4 TABLET | Refills: 0 | Status: SHIPPED | OUTPATIENT
Start: 2021-08-31 | End: 2021-09-01

## 2021-09-01 RX ORDER — VALACYCLOVIR HYDROCHLORIDE 1 G/1
TABLET, FILM COATED ORAL
Qty: 20 TABLET | Refills: 3 | Status: SHIPPED | OUTPATIENT
Start: 2021-09-01 | End: 2021-09-02 | Stop reason: SDUPTHER

## 2021-09-01 RX ORDER — VALACYCLOVIR HYDROCHLORIDE 1 G/1
TABLET, FILM COATED ORAL
Qty: 4 TABLET | Refills: 0 | OUTPATIENT
Start: 2021-09-01

## 2021-09-01 NOTE — TELEPHONE ENCOUNTER
PATIENT IS CALLING IN STATING THAT THE PHARMACY HAS INFORMED HIM THAT THIS REFILL REQUEST HAS BEEN DENIED AND HE DOES NOT KNOW WHY.  HE SAYS THAT HE GOT ONE REFILL WITH 4 PILLS AND HE HAD TO TAKE TWO PILLS TWICE A DAY.  HE SAID HE CONDITION STILL HAS NOT CLEARED UP AND HE IS REQUESTING MORE.  PATIENT CAN BE REACHED  [phone]

## 2021-09-02 RX ORDER — VALACYCLOVIR HYDROCHLORIDE 1 G/1
TABLET, FILM COATED ORAL
Qty: 20 TABLET | Refills: 3 | Status: SHIPPED | OUTPATIENT
Start: 2021-09-02 | End: 2022-08-16

## 2021-11-11 RX ORDER — LEVOTHYROXINE SODIUM 150 UG/1
TABLET ORAL
Qty: 90 TABLET | Refills: 0 | Status: SHIPPED | OUTPATIENT
Start: 2021-11-11 | End: 2022-03-16

## 2022-03-16 RX ORDER — LEVOTHYROXINE SODIUM 150 UG/1
TABLET ORAL
Qty: 90 TABLET | Refills: 0 | Status: SHIPPED | OUTPATIENT
Start: 2022-03-16 | End: 2022-07-07 | Stop reason: SDUPTHER

## 2022-03-16 NOTE — TELEPHONE ENCOUNTER
Rx Refill Note  Requested Prescriptions     Pending Prescriptions Disp Refills   • Euthyrox 150 MCG tablet [Pharmacy Med Name: Euthyrox 150 MCG Oral Tablet] 90 tablet 0     Sig: Take 1 tablet by mouth once daily      Last office visit with prescribing clinician: 6/9/2021      Next office visit with prescribing clinician: Visit date not found     Lipid Panel (05/26/2021 08:35)  Comprehensive Metabolic Panel (05/26/2021 08:35)         Merced Pryor CMA  03/16/22, 09:00 EDT

## 2022-06-29 DIAGNOSIS — E03.9 ACQUIRED HYPOTHYROIDISM: Primary | ICD-10-CM

## 2022-06-29 DIAGNOSIS — Z13.6 ENCOUNTER FOR LIPID SCREENING FOR CARDIOVASCULAR DISEASE: ICD-10-CM

## 2022-06-29 DIAGNOSIS — Z13.220 ENCOUNTER FOR LIPID SCREENING FOR CARDIOVASCULAR DISEASE: ICD-10-CM

## 2022-06-29 RX ORDER — LEVOTHYROXINE SODIUM 0.15 MG/1
TABLET ORAL
Qty: 30 TABLET | OUTPATIENT
Start: 2022-06-29

## 2022-07-07 ENCOUNTER — TELEPHONE (OUTPATIENT)
Dept: FAMILY MEDICINE CLINIC | Facility: CLINIC | Age: 42
End: 2022-07-07

## 2022-07-07 RX ORDER — LEVOTHYROXINE SODIUM 0.15 MG/1
150 TABLET ORAL DAILY
Qty: 90 TABLET | Refills: 0 | Status: SHIPPED | OUTPATIENT
Start: 2022-07-07 | End: 2022-08-18

## 2022-07-07 NOTE — TELEPHONE ENCOUNTER
PATIENT REQUESTED A REFILL ON:levothyroxine (SYNTHROID, LEVOTHROID) 150 MCG tablet     PATIENT CAN BE REACHED ON:[phone]     PHARMACY PREFERR[facility] [address] - [phone]  - [phone] FX  [phone]

## 2022-08-03 ENCOUNTER — TELEPHONE (OUTPATIENT)
Dept: FAMILY MEDICINE CLINIC | Facility: CLINIC | Age: 42
End: 2022-08-03

## 2022-08-03 NOTE — TELEPHONE ENCOUNTER
HUB TO READ    Left msg for patient to call back to schedule appt to have labs drawn prior to 8/18 appt with Dr. Wall.

## 2022-08-03 NOTE — TELEPHONE ENCOUNTER
----- Message from Elvia Pike RT sent at 8/3/2022 12:22 PM EDT -----  Due fasting labs before 8/18 appt    ----- Message -----  From: SYSTEM  Sent: 8/3/2022   1:19 AM EDT  To: [facility]

## 2022-08-04 ENCOUNTER — TELEPHONE (OUTPATIENT)
Dept: FAMILY MEDICINE CLINIC | Facility: CLINIC | Age: 42
End: 2022-08-04

## 2022-08-04 NOTE — TELEPHONE ENCOUNTER
Hub staff attempted to follow warm transfer process and was unsuccessful     Caller: Bennett Wheeler    Relationship to patient: Self    Best call back number: [phone]    Patient is needing:NEEDS TO SCHEDULE LABS BEFORE HIS APPOINTMENT ON 8/18/22.  PLEASE CALL TO SCHEDULE

## 2022-08-16 ENCOUNTER — CLINICAL SUPPORT (OUTPATIENT)
Dept: FAMILY MEDICINE CLINIC | Facility: CLINIC | Age: 42
End: 2022-08-16

## 2022-08-16 DIAGNOSIS — Z13.6 ENCOUNTER FOR LIPID SCREENING FOR CARDIOVASCULAR DISEASE: ICD-10-CM

## 2022-08-16 DIAGNOSIS — Z13.220 ENCOUNTER FOR LIPID SCREENING FOR CARDIOVASCULAR DISEASE: ICD-10-CM

## 2022-08-16 DIAGNOSIS — E03.9 ACQUIRED HYPOTHYROIDISM: ICD-10-CM

## 2022-08-16 PROCEDURE — 84443 ASSAY THYROID STIM HORMONE: CPT | Performed by: FAMILY MEDICINE

## 2022-08-16 PROCEDURE — 80053 COMPREHEN METABOLIC PANEL: CPT | Performed by: FAMILY MEDICINE

## 2022-08-16 PROCEDURE — 84439 ASSAY OF FREE THYROXINE: CPT | Performed by: FAMILY MEDICINE

## 2022-08-16 PROCEDURE — 80061 LIPID PANEL: CPT | Performed by: FAMILY MEDICINE

## 2022-08-16 PROCEDURE — 36415 COLL VENOUS BLD VENIPUNCTURE: CPT | Performed by: FAMILY MEDICINE

## 2022-08-16 RX ORDER — VALACYCLOVIR HYDROCHLORIDE 1 G/1
TABLET, FILM COATED ORAL
Qty: 20 TABLET | Refills: 3 | Status: SHIPPED | OUTPATIENT
Start: 2022-08-16 | End: 2023-01-19

## 2022-08-16 NOTE — PROGRESS NOTES
Venipuncture performed in L ARM by RT Renetta  with good hemostasis. Patient tolerated well. 08/16/22 Elvia Pike, RT

## 2022-08-17 LAB
ALBUMIN SERPL-MCNC: 4.3 G/DL (ref 3.5–5.2)
ALBUMIN/GLOB SERPL: 2 G/DL
ALP SERPL-CCNC: 51 U/L (ref 39–117)
ALT SERPL W P-5'-P-CCNC: 21 U/L (ref 1–41)
ANION GAP SERPL CALCULATED.3IONS-SCNC: 7.9 MMOL/L (ref 5–15)
AST SERPL-CCNC: 31 U/L (ref 1–40)
BILIRUB SERPL-MCNC: 0.9 MG/DL (ref 0–1.2)
BUN SERPL-MCNC: 13 MG/DL (ref 6–20)
BUN/CREAT SERPL: 10.8 (ref 7–25)
CALCIUM SPEC-SCNC: 9.5 MG/DL (ref 8.6–10.5)
CHLORIDE SERPL-SCNC: 104 MMOL/L (ref 98–107)
CHOLEST SERPL-MCNC: 199 MG/DL (ref 0–200)
CO2 SERPL-SCNC: 27.1 MMOL/L (ref 22–29)
CREAT SERPL-MCNC: 1.2 MG/DL (ref 0.76–1.27)
EGFRCR SERPLBLD CKD-EPI 2021: 77.4 ML/MIN/1.73
GLOBULIN UR ELPH-MCNC: 2.1 GM/DL
GLUCOSE SERPL-MCNC: 95 MG/DL (ref 65–99)
HDLC SERPL-MCNC: 62 MG/DL (ref 40–60)
LDLC SERPL CALC-MCNC: 124 MG/DL (ref 0–100)
LDLC/HDLC SERPL: 1.98 {RATIO}
POTASSIUM SERPL-SCNC: 4.5 MMOL/L (ref 3.5–5.2)
PROT SERPL-MCNC: 6.4 G/DL (ref 6–8.5)
SODIUM SERPL-SCNC: 139 MMOL/L (ref 136–145)
T4 FREE SERPL-MCNC: 0.85 NG/DL (ref 0.93–1.7)
TRIGL SERPL-MCNC: 70 MG/DL (ref 0–150)
TSH SERPL DL<=0.05 MIU/L-ACNC: 28.9 UIU/ML (ref 0.27–4.2)
VLDLC SERPL-MCNC: 13 MG/DL (ref 5–40)

## 2022-08-18 ENCOUNTER — OFFICE VISIT (OUTPATIENT)
Dept: FAMILY MEDICINE CLINIC | Facility: CLINIC | Age: 42
End: 2022-08-18

## 2022-08-18 VITALS
HEART RATE: 57 BPM | DIASTOLIC BLOOD PRESSURE: 74 MMHG | OXYGEN SATURATION: 100 % | WEIGHT: 227 LBS | BODY MASS INDEX: 33.62 KG/M2 | SYSTOLIC BLOOD PRESSURE: 123 MMHG | HEIGHT: 69 IN | RESPIRATION RATE: 12 BRPM | TEMPERATURE: 97.3 F

## 2022-08-18 DIAGNOSIS — Z00.00 ANNUAL PHYSICAL EXAM: Primary | ICD-10-CM

## 2022-08-18 DIAGNOSIS — E03.9 ACQUIRED HYPOTHYROIDISM: ICD-10-CM

## 2022-08-18 LAB
BILIRUB BLD-MCNC: NEGATIVE MG/DL
CLARITY, POC: CLEAR
COLOR UR: YELLOW
EXPIRATION DATE: NORMAL
GLUCOSE UR STRIP-MCNC: NEGATIVE MG/DL
KETONES UR QL: NEGATIVE
LEUKOCYTE EST, POC: NEGATIVE
Lab: NORMAL
NITRITE UR-MCNC: NEGATIVE MG/ML
PH UR: 7 [PH] (ref 5–8)
PROT UR STRIP-MCNC: NEGATIVE MG/DL
RBC # UR STRIP: NEGATIVE /UL
SP GR UR: 1.02 (ref 1–1.03)
UROBILINOGEN UR QL: NORMAL

## 2022-08-18 PROCEDURE — 99396 PREV VISIT EST AGE 40-64: CPT | Performed by: FAMILY MEDICINE

## 2022-08-18 PROCEDURE — 81003 URINALYSIS AUTO W/O SCOPE: CPT | Performed by: FAMILY MEDICINE

## 2022-08-18 RX ORDER — LEVOTHYROXINE SODIUM 175 UG/1
175 TABLET ORAL DAILY
Qty: 90 TABLET | Refills: 0 | Status: SHIPPED | OUTPATIENT
Start: 2022-08-18 | End: 2022-11-14 | Stop reason: SDUPTHER

## 2022-08-18 NOTE — PROGRESS NOTES
Subjective   Bennett Wheeler is a 42 y.o. male.     Chief Complaint   Patient presents with   • Annual Exam     Physical          Current Outpatient Medications:   •  levothyroxine (Euthyrox) 175 MCG tablet, Take 1 tablet by mouth Daily., Disp: 90 tablet, Rfl: 0  •  valACYclovir (VALTREX) 1000 MG tablet, TAKE TWO TABLETS BY MOUTH TWICE A DAY AS NEEDED FOR OUTBREAK, Disp: 20 tablet, Rfl: 3    Past Medical History:   Diagnosis Date   • Hemorrhoids, external 7/14/2016   • Hypothyroidism    • Obesity 7/14/2016       Past Surgical History:   Procedure Laterality Date   • LIPOMA EXCISION  2015    HEAD   • TONSILLECTOMY         Family History   Problem Relation Age of Onset   • Heart disease Mother    • Drug abuse Father    • Alcohol abuse Father    • Heart disease Father    • Diabetes Maternal Grandmother    • Heart disease Maternal Grandmother    • Hypertension Maternal Grandmother    • Kidney disease Maternal Grandmother        Social History     Socioeconomic History   • Marital status: Single   Tobacco Use   • Smoking status: Never Smoker   • Smokeless tobacco: Never Used   Vaping Use   • Vaping Use: Never used   Substance and Sexual Activity   • Alcohol use: Yes     Comment: occ   • Drug use: No   • Sexual activity: Defer       41 y/o AA male here for annual PE w/ hx/o hypothyroidism and recent fasting labs    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    Review of Systems   Constitutional: Positive for activity change and appetite change. Negative for fatigue, unexpected weight gain and  unexpected weight loss.   HENT: Negative for congestion, ear pain, hearing loss, nosebleeds, postnasal drip, rhinorrhea, sinus pressure, sneezing, sore throat, tinnitus and trouble swallowing.    Eyes: Negative for blurred vision and double vision.   Respiratory: Negative for cough and shortness of breath.    Cardiovascular: Negative for chest pain.   Gastrointestinal: Negative for abdominal pain, constipation, diarrhea, nausea, vomiting, GERD and indigestion.   Endocrine: Negative for cold intolerance and heat intolerance.   Genitourinary: Negative for difficulty urinating, dysuria, flank pain, frequency, urgency and urinary incontinence.   Musculoskeletal: Negative for arthralgias, gait problem and myalgias.   Skin: Negative for rash and skin lesions.   Neurological: Negative for weakness, memory problem and confusion.   Psychiatric/Behavioral: Negative for agitation, self-injury, suicidal ideas, depressed mood and stress. The patient is not nervous/anxious.        Vitals:    08/18/22 1437   BP: 123/74   Pulse: 57   Resp: 12   Temp: 97.3 °F (36.3 °C)   SpO2: 100%       Objective   Physical Exam  Vitals and nursing note reviewed.   Constitutional:       General: He is not in acute distress.     Appearance: Normal appearance. He is well-developed. He is not ill-appearing, toxic-appearing or diaphoretic.   HENT:      Head: Normocephalic and atraumatic.      Right Ear: Tympanic membrane, ear canal and external ear normal. There is no impacted cerumen.      Left Ear: Tympanic membrane, ear canal and external ear normal. There is no impacted cerumen.      Nose: Nose normal. No congestion or rhinorrhea.      Mouth/Throat:      Mouth: Mucous membranes are moist.      Pharynx: Oropharynx is clear. No oropharyngeal exudate or posterior oropharyngeal erythema.   Eyes:      Extraocular Movements: Extraocular movements intact.      Conjunctiva/sclera: Conjunctivae normal.      Pupils: Pupils are equal, round, and reactive to  light.   Neck:      Thyroid: No thyromegaly.      Vascular: No carotid bruit.   Cardiovascular:      Rate and Rhythm: Normal rate and regular rhythm.      Heart sounds: Normal heart sounds. No murmur heard.  Pulmonary:      Effort: Pulmonary effort is normal. No respiratory distress.      Breath sounds: Normal breath sounds. No stridor. No wheezing or rales.   Abdominal:      General: Bowel sounds are normal. There is no distension.      Palpations: Abdomen is soft. There is no mass.      Tenderness: There is no abdominal tenderness. There is no guarding or rebound.      Hernia: No hernia is present. There is no hernia in the left inguinal area or right inguinal area.   Genitourinary:     Penis: Normal and circumcised. No erythema or discharge.       Testes: Normal.         Right: Mass, tenderness or swelling not present.         Left: Mass, tenderness or swelling not present.   Musculoskeletal:         General: Normal range of motion.      Cervical back: Normal range of motion and neck supple.      Right lower leg: No edema.      Left lower leg: No edema.   Lymphadenopathy:      Cervical: No cervical adenopathy.      Lower Body: No right inguinal adenopathy. No left inguinal adenopathy.   Skin:     General: Skin is warm and dry.      Capillary Refill: Capillary refill takes less than 2 seconds.      Findings: No erythema or rash.   Neurological:      General: No focal deficit present.      Mental Status: He is alert and oriented to person, place, and time. Mental status is at baseline.      Cranial Nerves: No cranial nerve deficit.      Motor: No abnormal muscle tone.      Deep Tendon Reflexes: Reflexes normal.   Psychiatric:         Mood and Affect: Mood normal.         Behavior: Behavior normal.         Thought Content: Thought content normal.         Judgment: Judgment normal.           Assessment & Plan   Diagnoses and all orders for this visit:    1. Annual physical exam (Primary)  -     POC Urinalysis  Dipstick, Automated    2. Acquired hypothyroidism  -     T4, Free; Future  -     TSH; Future    Other orders  -     levothyroxine (Euthyrox) 175 MCG tablet; Take 1 tablet by mouth Daily.  Dispense: 90 tablet; Refill: 0    inc thyroid dose and rech in 2mos  Encouraged pt to work on diet and exercise for healthy wt

## 2022-11-11 RX ORDER — LEVOTHYROXINE SODIUM 175 UG/1
175 TABLET ORAL DAILY
Qty: 90 TABLET | Refills: 0 | OUTPATIENT
Start: 2022-11-11

## 2022-11-11 NOTE — TELEPHONE ENCOUNTER
Incoming Refill Request      Medication requested (name and dose): Levothyroxine 175 mcg    [facility] where [facility] sent[facility]    Additional details provided by patient: n/a    Best call back number:     Does the patient have less than a 3 day supply:  [] Yes  [x] No    Tanvi Alegria, [facility] Rep  11/11/22, 16:38 EST

## 2022-11-11 NOTE — TELEPHONE ENCOUNTER
Rx Refill Note  Requested Prescriptions     Pending Prescriptions Disp Refills   • levothyroxine (Euthyrox) 175 MCG tablet 90 tablet 0     Sig: Take 1 tablet by mouth Daily.      Last office visit with prescribing clinician: 8/18/2022      Next office visit with prescribing clinician: Visit date not found     TSH (08/16/2022 12:17)  T4, Free (08/16/2022 12:17)  Lipid Panel (08/16/2022 12:17)         Merced Meléndez CMA  11/11/22, 16:59 EST

## 2022-11-14 RX ORDER — LEVOTHYROXINE SODIUM 175 UG/1
175 TABLET ORAL DAILY
Qty: 90 TABLET | Refills: 0 | Status: SHIPPED | OUTPATIENT
Start: 2022-11-14 | End: 2022-11-23 | Stop reason: SDUPTHER

## 2022-11-22 ENCOUNTER — CLINICAL SUPPORT (OUTPATIENT)
Dept: FAMILY MEDICINE CLINIC | Facility: CLINIC | Age: 42
End: 2022-11-22

## 2022-11-22 DIAGNOSIS — E03.9 ACQUIRED HYPOTHYROIDISM: ICD-10-CM

## 2022-11-22 PROCEDURE — 84439 ASSAY OF FREE THYROXINE: CPT | Performed by: FAMILY MEDICINE

## 2022-11-22 PROCEDURE — 84443 ASSAY THYROID STIM HORMONE: CPT | Performed by: FAMILY MEDICINE

## 2022-11-22 PROCEDURE — 36415 COLL VENOUS BLD VENIPUNCTURE: CPT | Performed by: FAMILY MEDICINE

## 2022-11-22 NOTE — PROGRESS NOTES
Venipuncture performed in L ARM by RT Renetta  with good hemostasis. Patient tolerated well. 11/22/22 Elvia Pike, RT

## 2022-11-23 ENCOUNTER — TELEPHONE (OUTPATIENT)
Dept: FAMILY MEDICINE CLINIC | Facility: CLINIC | Age: 42
End: 2022-11-23

## 2022-11-23 LAB
T4 FREE SERPL-MCNC: 2.07 NG/DL (ref 0.93–1.7)
TSH SERPL DL<=0.05 MIU/L-ACNC: 2.96 UIU/ML (ref 0.27–4.2)

## 2022-11-23 RX ORDER — LEVOTHYROXINE SODIUM 175 UG/1
TABLET ORAL
Qty: 90 TABLET | Refills: 0 | Status: SHIPPED | OUTPATIENT
Start: 2022-11-23 | End: 2022-12-30 | Stop reason: SDUPTHER

## 2022-11-23 NOTE — TELEPHONE ENCOUNTER
HUB to share    Thyroid dose a little high----cut Sunday dose in 1/2 and rech in 2-3mos    LVM informing pt

## 2022-12-30 RX ORDER — LEVOTHYROXINE SODIUM 175 UG/1
TABLET ORAL
Qty: 90 TABLET | Refills: 0 | Status: SHIPPED | OUTPATIENT
Start: 2022-12-30

## 2022-12-30 NOTE — TELEPHONE ENCOUNTER
Caller: Bennett Wheeler    Relationship: Self    Best call back number: [phone]    Requested Prescriptions:   Requested Prescriptions     Pending Prescriptions Disp Refills   • levothyroxine (Euthyrox) 175 MCG tablet 90 tablet 0     Si po qday M-Sat and 1/2 tab on Sun        Pharmacy where request should be sent: [id number] [place], [place] - [address] [facility] [phone] [facility] [phone]       Additional details provided by patient: PATIENT STATES HE IS COMPLETELY OUT OF THIS MEDICATION.     Does the patient have less than a 3 day supply:  [x] Yes  [] No    Would you like a call back once the refill request has been completed: [x] Yes [] No    If the office needs to give you a call back, can they leave a voicemail: [x] Yes [] No    Priya Gutierrez Rep   22 12:57 EST

## 2023-01-19 RX ORDER — VALACYCLOVIR HYDROCHLORIDE 1 G/1
TABLET, FILM COATED ORAL
Qty: 20 TABLET | Refills: 3 | Status: SHIPPED | OUTPATIENT
Start: 2023-01-19

## 2023-01-19 NOTE — TELEPHONE ENCOUNTER
Rx Refill Note  Requested Prescriptions     Pending Prescriptions Disp Refills   • valACYclovir (VALTREX) 1000 MG tablet [Pharmacy Med Name: VALACYCLOVIR HCL 1 GRAM TABLET] 20 tablet 3     Sig: TAKE TWO TABLETS BY MOUTH TWICE A DAY AS NEEDED FOR OUTBREAK      Last office visit with prescribing clinician: 8/18/2022   Last telemedicine visit with prescribing clinician: Visit date not found   Next office visit with prescribing clinician: Visit date not found     Comprehensive Metabolic Panel (08/16/2022 12:17)  Lipid Panel (08/16/2022 12:17)                      Would you like a call back once the refill request has been completed: [] Yes [] No    If the office needs to give you a call back, can they leave a voicemail: [] Yes [] No    Merced Meléndez CMA  01/19/23, 12:11 EST

## 2023-04-17 RX ORDER — LEVOTHYROXINE SODIUM 175 UG/1
TABLET ORAL
Qty: 30 TABLET | Refills: 1 | Status: SHIPPED | OUTPATIENT
Start: 2023-04-17

## 2023-04-17 NOTE — TELEPHONE ENCOUNTER
Rx Refill Note  Requested Prescriptions     Pending Prescriptions Disp Refills   • levothyroxine (SYNTHROID, LEVOTHROID) 175 MCG tablet [Pharmacy Med Name: LEVOTHYROXINE 175 MCG TABLET] 30 tablet      Sig: TAKE ONE TABLET BY MOUTH DAILY MONDAY-SATURDAY AND 1/2 TABLET ON SUNDAY      Last office visit with prescribing clinician: 8/18/2022   Last telemedicine visit with prescribing clinician: Visit date not found   Next office visit with prescribing clinician: Visit date not found                         Would you like a call back once the refill request has been completed: [] Yes [] No    If the office needs to give you a call back, can they leave a voicemail: [] Yes [] No    Merced Meléndez CMA  04/17/23, 08:53 EDT

## 2023-06-05 ENCOUNTER — TELEPHONE (OUTPATIENT)
Dept: FAMILY MEDICINE CLINIC | Facility: CLINIC | Age: 43
End: 2023-06-05

## 2023-06-05 DIAGNOSIS — Z13.220 ENCOUNTER FOR LIPID SCREENING FOR CARDIOVASCULAR DISEASE: ICD-10-CM

## 2023-06-05 DIAGNOSIS — E03.9 ACQUIRED HYPOTHYROIDISM: Primary | ICD-10-CM

## 2023-06-05 DIAGNOSIS — Z13.6 ENCOUNTER FOR LIPID SCREENING FOR CARDIOVASCULAR DISEASE: ICD-10-CM

## 2023-09-07 ENCOUNTER — CLINICAL SUPPORT (OUTPATIENT)
Dept: FAMILY MEDICINE CLINIC | Facility: CLINIC | Age: 43
End: 2023-09-07
Payer: COMMERCIAL

## 2023-09-07 DIAGNOSIS — E03.9 ACQUIRED HYPOTHYROIDISM: ICD-10-CM

## 2023-09-07 DIAGNOSIS — Z13.6 ENCOUNTER FOR LIPID SCREENING FOR CARDIOVASCULAR DISEASE: ICD-10-CM

## 2023-09-07 DIAGNOSIS — Z13.220 ENCOUNTER FOR LIPID SCREENING FOR CARDIOVASCULAR DISEASE: ICD-10-CM

## 2023-09-07 PROCEDURE — 84439 ASSAY OF FREE THYROXINE: CPT | Performed by: FAMILY MEDICINE

## 2023-09-07 PROCEDURE — 80061 LIPID PANEL: CPT | Performed by: FAMILY MEDICINE

## 2023-09-07 PROCEDURE — 84443 ASSAY THYROID STIM HORMONE: CPT | Performed by: FAMILY MEDICINE

## 2023-09-07 PROCEDURE — 80053 COMPREHEN METABOLIC PANEL: CPT | Performed by: FAMILY MEDICINE

## 2023-09-07 PROCEDURE — 36415 COLL VENOUS BLD VENIPUNCTURE: CPT | Performed by: FAMILY MEDICINE

## 2023-09-07 NOTE — PROGRESS NOTES
Venipuncture performed in right arm by Korin Ridley MA with good hemostasis. Patient tolerated well. Korin Ridley MA 04/14/23

## 2023-09-08 LAB
ALBUMIN SERPL-MCNC: 4.7 G/DL (ref 3.5–5.2)
ALBUMIN/GLOB SERPL: 2.1 G/DL
ALP SERPL-CCNC: 71 U/L (ref 39–117)
ALT SERPL W P-5'-P-CCNC: 31 U/L (ref 1–41)
ANION GAP SERPL CALCULATED.3IONS-SCNC: 9 MMOL/L (ref 5–15)
AST SERPL-CCNC: 45 U/L (ref 1–40)
BILIRUB SERPL-MCNC: 1 MG/DL (ref 0–1.2)
BUN SERPL-MCNC: 15 MG/DL (ref 6–20)
BUN/CREAT SERPL: 11.3 (ref 7–25)
CALCIUM SPEC-SCNC: 9.9 MG/DL (ref 8.6–10.5)
CHLORIDE SERPL-SCNC: 103 MMOL/L (ref 98–107)
CHOLEST SERPL-MCNC: 191 MG/DL (ref 0–200)
CO2 SERPL-SCNC: 27 MMOL/L (ref 22–29)
CREAT SERPL-MCNC: 1.33 MG/DL (ref 0.76–1.27)
EGFRCR SERPLBLD CKD-EPI 2021: 68 ML/MIN/1.73
GLOBULIN UR ELPH-MCNC: 2.2 GM/DL
GLUCOSE SERPL-MCNC: 95 MG/DL (ref 65–99)
HDLC SERPL-MCNC: 65 MG/DL (ref 40–60)
LDLC SERPL CALC-MCNC: 112 MG/DL (ref 0–100)
LDLC/HDLC SERPL: 1.7 {RATIO}
POTASSIUM SERPL-SCNC: 4.9 MMOL/L (ref 3.5–5.2)
PROT SERPL-MCNC: 6.9 G/DL (ref 6–8.5)
SODIUM SERPL-SCNC: 139 MMOL/L (ref 136–145)
T4 FREE SERPL-MCNC: 2.01 NG/DL (ref 0.93–1.7)
TRIGL SERPL-MCNC: 79 MG/DL (ref 0–150)
TSH SERPL DL<=0.05 MIU/L-ACNC: 2.4 UIU/ML (ref 0.27–4.2)
VLDLC SERPL-MCNC: 14 MG/DL (ref 5–40)

## 2023-09-11 ENCOUNTER — TELEPHONE (OUTPATIENT)
Dept: FAMILY MEDICINE CLINIC | Facility: CLINIC | Age: 43
End: 2023-09-11
Payer: COMMERCIAL

## 2023-09-11 NOTE — TELEPHONE ENCOUNTER
HUB TO READ      Karina Wall, DO     Free T4 too high still ----has he been cutting his Sunday dose in 1/2????      LEFT VOICEMAIL INFORMING PT

## 2023-09-14 ENCOUNTER — OFFICE VISIT (OUTPATIENT)
Dept: FAMILY MEDICINE CLINIC | Facility: CLINIC | Age: 43
End: 2023-09-14
Payer: COMMERCIAL

## 2023-09-14 VITALS
WEIGHT: 230 LBS | DIASTOLIC BLOOD PRESSURE: 77 MMHG | SYSTOLIC BLOOD PRESSURE: 131 MMHG | BODY MASS INDEX: 34.07 KG/M2 | RESPIRATION RATE: 16 BRPM | OXYGEN SATURATION: 96 % | HEIGHT: 69 IN | TEMPERATURE: 97.5 F | HEART RATE: 61 BPM

## 2023-09-14 DIAGNOSIS — E03.9 ACQUIRED HYPOTHYROIDISM: ICD-10-CM

## 2023-09-14 DIAGNOSIS — B35.3 TINEA PEDIS OF BOTH FEET: ICD-10-CM

## 2023-09-14 DIAGNOSIS — R79.89 ELEVATED SERUM CREATININE: ICD-10-CM

## 2023-09-14 DIAGNOSIS — Z00.00 ANNUAL PHYSICAL EXAM: Primary | ICD-10-CM

## 2023-09-14 LAB
BILIRUB BLD-MCNC: NEGATIVE MG/DL
CLARITY, POC: CLEAR
COLOR UR: YELLOW
EXPIRATION DATE: ABNORMAL
GLUCOSE UR STRIP-MCNC: NEGATIVE MG/DL
KETONES UR QL: NEGATIVE
LEUKOCYTE EST, POC: NEGATIVE
Lab: ABNORMAL
NITRITE UR-MCNC: NEGATIVE MG/ML
PH UR: 6 [PH] (ref 5–8)
PROT UR STRIP-MCNC: ABNORMAL MG/DL
RBC # UR STRIP: NEGATIVE /UL
SP GR UR: 1.02 (ref 1–1.03)
UROBILINOGEN UR QL: ABNORMAL

## 2023-09-14 PROCEDURE — 99396 PREV VISIT EST AGE 40-64: CPT | Performed by: FAMILY MEDICINE

## 2023-09-14 PROCEDURE — 81003 URINALYSIS AUTO W/O SCOPE: CPT | Performed by: FAMILY MEDICINE

## 2023-09-14 RX ORDER — TERBINAFINE HYDROCHLORIDE 250 MG/1
250 TABLET ORAL DAILY
Qty: 14 TABLET | Refills: 0 | Status: SHIPPED | OUTPATIENT
Start: 2023-09-14

## 2023-09-14 NOTE — PROGRESS NOTES
Subjective   Bennett Wheeler is a 43 y.o. male.     Chief Complaint   Patient presents with    Annual Exam     Physical          Current Outpatient Medications:     levothyroxine (SYNTHROID, LEVOTHROID) 175 MCG tablet, TAKE ONE TABLET BY MOUTH DAILY MONDAY- SATURDAY AND TAKE 1/2 TABLET ON SUNDAY, Disp: 90 tablet, Rfl: 1    valACYclovir (VALTREX) 1000 MG tablet, TAKE TWO TABLETS BY MOUTH TWICE A DAY AS NEEDED FOR OUTBREAK, Disp: 20 tablet, Rfl: 3    terbinafine (lamiSIL) 250 MG tablet, Take 1 tablet by mouth Daily., Disp: 14 tablet, Rfl: 0    Past Medical History:   Diagnosis Date    Hemorrhoids, external 7/14/2016    Hypothyroidism     Obesity 7/14/2016       Past Surgical History:   Procedure Laterality Date    LIPOMA EXCISION  2015    HEAD    TONSILLECTOMY         Family History   Problem Relation Age of Onset    Heart disease Mother     Drug abuse Father     Alcohol abuse Father     Heart disease Father     Diabetes Maternal Grandmother     Heart disease Maternal Grandmother     Hypertension Maternal Grandmother     Kidney disease Maternal Grandmother        Social History     Socioeconomic History    Marital status: Single   Tobacco Use    Smoking status: Never     Passive exposure: Never    Smokeless tobacco: Never   Vaping Use    Vaping Use: Never used   Substance and Sexual Activity    Alcohol use: Yes     Comment: occ    Drug use: No    Sexual activity: Defer       History of Present Illness     44 y/o AA male presents for annual exam w/ hx/o Hypothyroid    The patient denies taking any vitamins. He denies staying hydrated. He drinks 3 cups of coffee every morning and Gatorade throughout the day. He denies taking 0.5 tablet of levothyroxine on Sunday. He sees a dentist annually. He has his eyes examined annually. He admits to blowing his nose constantly. He denies any gas, bloating, diarrhea, or constipation. He denies any heartburn, chest pain, shortness of breath, or wheezing. He experienced athlete's foot  and used a spray twice daily and has now mostly resolved. He denies any testicle pain.     The following portions of the patient's history were reviewed and updated as appropriate: allergies, current medications, past family history, past medical history, past social history, past surgical history and problem list.    Review of Systems   Constitutional:  Negative for activity change, appetite change, fatigue, unexpected weight gain and unexpected weight loss.   HENT:  Negative for congestion, ear pain, hearing loss, nosebleeds, postnasal drip, rhinorrhea, sinus pressure, sneezing, sore throat, tinnitus and trouble swallowing.    Eyes:  Negative for blurred vision and double vision.   Respiratory:  Negative for cough and shortness of breath.    Cardiovascular:  Negative for chest pain and palpitations.   Gastrointestinal:  Negative for abdominal pain, constipation, diarrhea, nausea, vomiting, GERD and indigestion.   Genitourinary:  Negative for difficulty urinating, dysuria, flank pain, frequency, urgency and urinary incontinence.   Musculoskeletal:  Negative for arthralgias and myalgias.   Skin:  Positive for rash. Negative for skin lesions.   Neurological:  Negative for weakness, memory problem and confusion.   Psychiatric/Behavioral:  Negative for agitation, self-injury, suicidal ideas, depressed mood and stress. The patient is not nervous/anxious.      Vitals:    09/14/23 1441   BP: 131/77   Pulse: 61   Resp: 16   Temp: 97.5 °F (36.4 °C)   SpO2: 96%       Objective   Physical Exam  Vitals and nursing note reviewed.   Constitutional:       General: He is not in acute distress.     Appearance: Normal appearance. He is well-developed. He is not ill-appearing, toxic-appearing or diaphoretic.   HENT:      Head: Normocephalic and atraumatic.      Right Ear: Tympanic membrane, ear canal and external ear normal. There is no impacted cerumen.      Left Ear: Tympanic membrane, ear canal and external ear normal. There is no  impacted cerumen.      Nose: Nose normal. No congestion or rhinorrhea.      Mouth/Throat:      Mouth: Mucous membranes are moist.      Pharynx: Oropharynx is clear. No oropharyngeal exudate or posterior oropharyngeal erythema.   Eyes:      Extraocular Movements: Extraocular movements intact.      Conjunctiva/sclera: Conjunctivae normal.      Pupils: Pupils are equal, round, and reactive to light.   Neck:      Thyroid: No thyromegaly.      Vascular: No carotid bruit.   Cardiovascular:      Rate and Rhythm: Normal rate and regular rhythm.      Pulses:           Dorsalis pedis pulses are 2+ on the right side and 2+ on the left side.      Heart sounds: Normal heart sounds. No murmur heard.  Pulmonary:      Effort: Pulmonary effort is normal. No respiratory distress.      Breath sounds: Normal breath sounds. No stridor. No wheezing or rales.   Abdominal:      General: Bowel sounds are normal. There is no distension.      Palpations: Abdomen is soft. There is no mass.      Tenderness: There is no abdominal tenderness. There is no guarding or rebound.      Hernia: No hernia is present. There is no hernia in the left inguinal area or right inguinal area.   Genitourinary:     Penis: Normal and circumcised. No erythema or discharge.       Testes: Normal.         Right: Mass, tenderness or swelling not present.         Left: Mass, tenderness or swelling not present.      Epididymis:      Right: Normal.      Left: Normal.      Bebo stage (genital): 5.   Musculoskeletal:         General: Normal range of motion.      Cervical back: Normal range of motion and neck supple.      Right lower leg: No edema.      Left lower leg: No edema.   Feet:      Right foot:      Skin integrity: Skin breakdown present.      Toenail Condition: Fungal disease present.     Left foot:      Skin integrity: Skin breakdown present.      Toenail Condition: Left toenails are normal.      Comments: +increased moisture/ mild skin breakdown b/w digits #4/5  b/l   Lymphadenopathy:      Cervical: No cervical adenopathy.      Lower Body: No right inguinal adenopathy. No left inguinal adenopathy.   Skin:     General: Skin is warm and dry.      Capillary Refill: Capillary refill takes less than 2 seconds.      Findings: No erythema or rash.   Neurological:      General: No focal deficit present.      Mental Status: He is alert and oriented to person, place, and time. Mental status is at baseline.      Cranial Nerves: No cranial nerve deficit.      Motor: No abnormal muscle tone.      Deep Tendon Reflexes: Reflexes normal.   Psychiatric:         Mood and Affect: Mood normal.         Behavior: Behavior normal.         Thought Content: Thought content normal.         Judgment: Judgment normal.       BMI is >= 30 and <35. (Class 1 Obesity). The following options were offered after discussion;: exercise counseling/recommendations and nutrition counseling/recommendations      Assessment & Plan   Diagnoses and all orders for this visit:    1. Annual physical exam (Primary)  -     POC Urinalysis Dipstick, Automated    2. Acquired hypothyroidism  -     TSH; Future  -     T4, Free; Future    3. Elevated serum creatinine  -     Basic Metabolic Panel; Future    4. Tinea pedis of both feet    Other orders  -     terbinafine (lamiSIL) 250 MG tablet; Take 1 tablet by mouth Daily.  Dispense: 14 tablet; Refill: 0      1. Annual physical exam-  Urinalysis ordered.    2. Hypothyroidism-  Take levothyroxine 1 tablet by mouth Monday-Saturday. Discontinue use on Sundays.     3. Athlete's foot-   Encouraged the patient to keep feet dry and spray with a toe spray.  Trial of oral antifungal x 2 weeks  Encouraged pt to cont w/ healthy diet choices and exercise routine    Transcribed from ambient dictation for Karina Wall DO by Johana Cage.  09/14/23   15:55 EDT    Patient or patient representative verbalized consent to the visit recording.  I have personally performed the services described in  this document as transcribed by the above individual, and it is both accurate and complete.

## 2023-09-15 PROBLEM — B30.9 VIRAL CONJUNCTIVITIS: Status: RESOLVED | Noted: 2020-12-11 | Resolved: 2023-09-15

## 2023-10-12 RX ORDER — LEVOTHYROXINE SODIUM 175 UG/1
TABLET ORAL
Qty: 84 TABLET | Refills: 0 | Status: SHIPPED | OUTPATIENT
Start: 2023-10-12

## 2023-11-02 ENCOUNTER — CLINICAL SUPPORT (OUTPATIENT)
Dept: FAMILY MEDICINE CLINIC | Facility: CLINIC | Age: 43
End: 2023-11-02
Payer: COMMERCIAL

## 2023-11-02 DIAGNOSIS — E03.9 ACQUIRED HYPOTHYROIDISM: ICD-10-CM

## 2023-11-02 DIAGNOSIS — R79.89 ELEVATED SERUM CREATININE: ICD-10-CM

## 2023-11-02 PROCEDURE — 84443 ASSAY THYROID STIM HORMONE: CPT | Performed by: FAMILY MEDICINE

## 2023-11-02 PROCEDURE — 84439 ASSAY OF FREE THYROXINE: CPT | Performed by: FAMILY MEDICINE

## 2023-11-02 PROCEDURE — 80048 BASIC METABOLIC PNL TOTAL CA: CPT | Performed by: FAMILY MEDICINE

## 2023-11-02 PROCEDURE — 36415 COLL VENOUS BLD VENIPUNCTURE: CPT | Performed by: FAMILY MEDICINE

## 2023-11-02 NOTE — PROGRESS NOTES
Venipuncture performed in L ARM by RT Renetta  with good hemostasis. Patient tolerated well. 11/02/23 Elvia Pike, RT

## 2023-11-03 LAB
ANION GAP SERPL CALCULATED.3IONS-SCNC: 10 MMOL/L (ref 5–15)
BUN SERPL-MCNC: 18 MG/DL (ref 6–20)
BUN/CREAT SERPL: 11.4 (ref 7–25)
CALCIUM SPEC-SCNC: 9.9 MG/DL (ref 8.6–10.5)
CHLORIDE SERPL-SCNC: 103 MMOL/L (ref 98–107)
CO2 SERPL-SCNC: 27 MMOL/L (ref 22–29)
CREAT SERPL-MCNC: 1.58 MG/DL (ref 0.76–1.27)
EGFRCR SERPLBLD CKD-EPI 2021: 55.3 ML/MIN/1.73
GLUCOSE SERPL-MCNC: 107 MG/DL (ref 65–99)
POTASSIUM SERPL-SCNC: 4.3 MMOL/L (ref 3.5–5.2)
SODIUM SERPL-SCNC: 140 MMOL/L (ref 136–145)
T4 FREE SERPL-MCNC: 1.72 NG/DL (ref 0.93–1.7)
TSH SERPL DL<=0.05 MIU/L-ACNC: 9.83 UIU/ML (ref 0.27–4.2)

## 2023-11-04 RX ORDER — LEVOTHYROXINE SODIUM 175 UG/1
TABLET ORAL
Qty: 84 TABLET | Refills: 2 | Status: SHIPPED | OUTPATIENT
Start: 2023-11-04

## 2024-01-15 RX ORDER — VALACYCLOVIR HYDROCHLORIDE 1 G/1
TABLET, FILM COATED ORAL
Qty: 20 TABLET | Refills: 3 | Status: SHIPPED | OUTPATIENT
Start: 2024-01-15

## 2024-05-15 RX ORDER — VALACYCLOVIR HYDROCHLORIDE 1 G/1
TABLET, FILM COATED ORAL
Qty: 20 TABLET | Refills: 3 | Status: SHIPPED | OUTPATIENT
Start: 2024-05-15

## 2024-05-24 ENCOUNTER — TELEPHONE (OUTPATIENT)
Dept: FAMILY MEDICINE CLINIC | Facility: CLINIC | Age: 44
End: 2024-05-24

## 2024-05-24 NOTE — TELEPHONE ENCOUNTER
Caller: Bennett Wheeler    Relationship: Self    Best call back number: [phone]    What is the best time to reach you: ANYTIME    Who are you requesting to speak with (clinical staff, provider,  specific staff member): CLINICAL STAFF    Do you know the name of the person who called: PATIENT     What was the call regarding: PATIENT CALLED AND SAID HE IS NOW LIVING IN [place] AND WANTS TO KNOW IF DR. THAO HAS A DOCTOR SHE CAN RECOMMEND IN THE [facility] FOR PRIMARY CARE    Is it okay if the provider responds through MyChart: NO

## 2024-05-27 RX ORDER — LEVOTHYROXINE SODIUM 175 UG/1
TABLET ORAL
Qty: 84 TABLET | Refills: 0 | Status: SHIPPED | OUTPATIENT
Start: 2024-05-27

## 2024-05-28 NOTE — TELEPHONE ENCOUNTER
Pt informed. Asked if you could refill meds when next due, if he hasn't found a Dr by then? He just got refills, but knows sometimes NP appts are scheduled far out. Please advise.

## 2024-05-29 NOTE — TELEPHONE ENCOUNTER
RELAY    I sent 3mos -------let me know if no appt by then - Dr Wall      Pt is going to let us know.

## 2024-11-18 RX ORDER — LEVOTHYROXINE SODIUM 175 UG/1
TABLET ORAL
Qty: 84 TABLET | Refills: 0 | OUTPATIENT
Start: 2024-11-18

## 2024-11-18 NOTE — TELEPHONE ENCOUNTER
Incoming Refill Request      Medication requested (name and dose): levothyroxine 175mcg    Pharm[facility] where request should be sent: [facility]    Additional details provided by patient:     Best call back number:     Does the patient have less than a 3 day supply:  [] Yes  [x] No    Tanvi Alegria, RegSched Rep  11/18/24, 08:59 EST

## 2024-11-18 NOTE — TELEPHONE ENCOUNTER
Rx Refill Note  Requested Prescriptions     Pending Prescriptions Disp Refills    levothyroxine (SYNTHROID, LEVOTHROID) 175 MCG tablet 84 tablet 0     Sig: TAKE 1 TABLET BY MOUTH DAILY MONDAY - SATURDAY AND TAKE 1/2 TABLET BY MOUTH ON SUNDAY      Last office visit with prescribing clinician: 9/14/2023   Last telemedicine visit with prescribing clinician: Visit date not found   Next office visit with prescribing clinician: Visit date not found        T4, Free (11/02/2023 15:38)                  Would you like a call back once the refill request has been completed: [] Yes [] No    If the office needs to give you a call back, can they leave a voicemail: [] Yes [] No    Elvia Pike, RT  11/18/24, 10:06 EST

## 2024-11-19 RX ORDER — LEVOTHYROXINE SODIUM 175 UG/1
TABLET ORAL
Qty: 84 TABLET | Refills: 0 | Status: SHIPPED | OUTPATIENT
Start: 2024-11-19

## 2024-11-19 NOTE — TELEPHONE ENCOUNTER
Patient returned call and states that he is on a waiting list for a PCP in [place]. He has an appt for a new PCP but it is not until January. Is there any way he can get a refill to get through until then? Please advise.

## 2024-11-19 NOTE — TELEPHONE ENCOUNTER
RELAY    Patient needs appt with Dr. Wall per Dr. Wall.    Left message for patient to call back to schedule.

## 2025-01-14 RX ORDER — VALACYCLOVIR HYDROCHLORIDE 1 G/1
TABLET, FILM COATED ORAL
Qty: 20 TABLET | Refills: 0 | Status: SHIPPED | OUTPATIENT
Start: 2025-01-14

## 2025-01-14 NOTE — TELEPHONE ENCOUNTER
Caller: Bennett Wheeler    Relationship: Self    Best call back number: [phone]     Requested Prescriptions:   Requested Prescriptions     Pending Prescriptions Disp Refills    valACYclovir (VALTREX) 1000 MG tablet 20 tablet 3     Sig: TAKE 2 TABLETS BY MOUTH TWICE A DAY AS NEEDED FOR BREAKOUT        Pharmacy where request should be sent: [id number] - [address] [phone] [place] [phone]      Last office visit with prescribing clinician: 9/14/2023   Last telemedicine visit with prescribing clinician: Visit date not found   Next office visit with prescribing clinician: Visit date not found     Additional details provided by patient: PATIENT IS REQUESTING A REFILL UNTIL HE FINDS A PCP IN [place]. PATIENT IS OUT OF MEDICATION.    Does the patient have less than a 3 day supply:  [x] Yes  [] No    Would you like a call back once the refill request has been completed: [x] Yes [] No    If the office needs to give you a call back, can they leave a voicemail: [x] Yes [] No    Priya Gillespie Rep   01/14/25 11:22 EST

## 2025-01-14 NOTE — TELEPHONE ENCOUNTER
Additional details provided by patient: PATIENT IS REQUESTING A REFILL UNTIL HE FINDS A PCP IN [place]. PATIENT IS OUT OF MEDICATION.     Rx Refill Note  Requested Prescriptions     Pending Prescriptions Disp Refills    valACYclovir (VALTREX) 1000 MG tablet 20 tablet 3     Sig: TAKE 2 TABLETS BY MOUTH TWICE A DAY AS NEEDED FOR BREAKOUT      Last office visit with prescribing clinician: 9/14/2023   Last telemedicine visit with prescribing clinician: Visit date not found   Next office visit with prescribing clinician: Visit date not found        Lipid Panel (09/07/2023 09:14)                  Would you like a call back once the refill request has been completed: [] Yes [] No    If the office needs to give you a call back, can they leave a voicemail: [] Yes [] No    Elvia Pike, RT  01/14/25, 11:51 EST

## 2025-02-03 RX ORDER — LEVOTHYROXINE SODIUM 175 UG/1
TABLET ORAL
Qty: 84 TABLET | Refills: 0 | OUTPATIENT
Start: 2025-02-03

## 2025-02-03 NOTE — TELEPHONE ENCOUNTER
Rx Refill Note  Requested Prescriptions     Pending Prescriptions Disp Refills    levothyroxine (SYNTHROID, LEVOTHROID) 175 MCG tablet [Pharmacy Med Name: LEVOTHYROXINE 0.175MG (175MCG) TABS] 84 tablet 0     Sig: TAKE 1 TABLET BY MOUTH DAILY ON MONDAY THROUGH SATURDAY AND HALF TABLET ON SUNDAY      Last office visit with prescribing clinician: 9/14/2023   Last telemedicine visit with prescribing clinician: Visit date not found   Next office visit with prescribing clinician: Visit date not found        TSH (11/02/2023 15:38)                  Would you like a call back once the refill request has been completed: [] Yes [] No    If the office needs to give you a call back, can they leave a voicemail: [] Yes [] No    Elvia Pike, RT  02/03/25, 11:33 EST

## 2025-05-06 RX ORDER — VALACYCLOVIR HYDROCHLORIDE 1 G/1
TABLET, FILM COATED ORAL
Qty: 20 TABLET | Refills: 0 | OUTPATIENT
Start: 2025-05-06